# Patient Record
Sex: MALE | Race: WHITE | NOT HISPANIC OR LATINO | Employment: OTHER | URBAN - NONMETROPOLITAN AREA
[De-identification: names, ages, dates, MRNs, and addresses within clinical notes are randomized per-mention and may not be internally consistent; named-entity substitution may affect disease eponyms.]

---

## 2023-03-28 DIAGNOSIS — I10 ESSENTIAL (PRIMARY) HYPERTENSION: ICD-10-CM

## 2023-03-28 RX ORDER — GABAPENTIN 600 MG/1
TABLET ORAL
COMMUNITY
End: 2024-01-17 | Stop reason: SDUPTHER

## 2023-03-28 RX ORDER — DICLOFENAC SODIUM 30 MG/G
GEL TOPICAL
COMMUNITY
Start: 2020-12-08

## 2023-03-28 RX ORDER — FINASTERIDE 5 MG/1
5 TABLET, FILM COATED ORAL DAILY
COMMUNITY
End: 2024-01-18

## 2023-03-28 RX ORDER — NAPROXEN 500 MG/1
500 TABLET ORAL 2 TIMES DAILY
COMMUNITY
End: 2023-07-06

## 2023-03-28 RX ORDER — PRAVASTATIN SODIUM 40 MG/1
40 TABLET ORAL DAILY
COMMUNITY
End: 2023-08-25

## 2023-03-28 RX ORDER — TAMSULOSIN HYDROCHLORIDE 0.4 MG/1
CAPSULE ORAL
COMMUNITY
Start: 2019-04-18 | End: 2023-09-18

## 2023-03-28 RX ORDER — LISINOPRIL 5 MG/1
TABLET ORAL
Qty: 90 TABLET | Refills: 3 | Status: SHIPPED | OUTPATIENT
Start: 2023-03-28 | End: 2023-08-01 | Stop reason: DRUGHIGH

## 2023-03-28 RX ORDER — CYCLOSPORINE 0.5 MG/ML
EMULSION OPHTHALMIC EVERY 12 HOURS
COMMUNITY
Start: 2014-08-15

## 2023-03-28 RX ORDER — LISINOPRIL 10 MG/1
1 TABLET ORAL DAILY
COMMUNITY
Start: 2023-02-08 | End: 2023-07-24

## 2023-05-31 LAB
AMPHETAMINE (PRESENCE) IN URINE BY SCREEN METHOD: NORMAL
AMPHETAMINE (PRESENCE) IN URINE BY SCREEN METHOD: NORMAL
BARBITURATES PRESENCE IN URINE BY SCREEN METHOD: NORMAL
BARBITURATES PRESENCE IN URINE BY SCREEN METHOD: NORMAL
BENZODIAZEPINE (PRESENCE) IN URINE BY SCREEN METHOD: NORMAL
BENZODIAZEPINE (PRESENCE) IN URINE BY SCREEN METHOD: NORMAL
CANNABINOIDS IN URINE BY SCREEN METHOD: NORMAL
CANNABINOIDS IN URINE BY SCREEN METHOD: NORMAL
COCAINE (PRESENCE) IN URINE BY SCREEN METHOD: NORMAL
COCAINE (PRESENCE) IN URINE BY SCREEN METHOD: NORMAL
DRUG SCREEN COMMENT URINE: NORMAL
DRUG SCREEN COMMENT URINE: NORMAL
FENTANYL URINE: NORMAL
FENTANYL URINE: NORMAL
METHADONE (PRESENCE) IN URINE BY SCREEN METHOD: NORMAL
METHADONE (PRESENCE) IN URINE BY SCREEN METHOD: NORMAL
OPIATES (PRESENCE) IN URINE BY SCREEN METHOD: NORMAL
OPIATES (PRESENCE) IN URINE BY SCREEN METHOD: NORMAL
OXYCODONE (PRESENCE) IN URINE BY SCREEN METHOD: NORMAL
OXYCODONE (PRESENCE) IN URINE BY SCREEN METHOD: NORMAL
PHENCYCLIDINE (PRESENCE) IN URINE BY SCREEN METHOD: NORMAL
PHENCYCLIDINE (PRESENCE) IN URINE BY SCREEN METHOD: NORMAL

## 2023-06-12 ENCOUNTER — HOSPITAL ENCOUNTER (OUTPATIENT)
Dept: DATA CONVERSION | Facility: HOSPITAL | Age: 78
End: 2023-06-12
Attending: ANESTHESIOLOGY | Admitting: ANESTHESIOLOGY
Payer: MEDICARE

## 2023-06-12 DIAGNOSIS — M54.17 RADICULOPATHY, LUMBOSACRAL REGION: ICD-10-CM

## 2023-06-12 DIAGNOSIS — M96.1 POSTLAMINECTOMY SYNDROME, NOT ELSEWHERE CLASSIFIED: ICD-10-CM

## 2023-06-12 DIAGNOSIS — Z96.641 PRESENCE OF RIGHT ARTIFICIAL HIP JOINT: ICD-10-CM

## 2023-06-12 DIAGNOSIS — M19.90 UNSPECIFIED OSTEOARTHRITIS, UNSPECIFIED SITE: ICD-10-CM

## 2023-06-12 DIAGNOSIS — E78.00 PURE HYPERCHOLESTEROLEMIA, UNSPECIFIED: ICD-10-CM

## 2023-06-12 DIAGNOSIS — I10 ESSENTIAL (PRIMARY) HYPERTENSION: ICD-10-CM

## 2023-06-26 ENCOUNTER — APPOINTMENT (OUTPATIENT)
Dept: PRIMARY CARE | Facility: CLINIC | Age: 78
End: 2023-06-26
Payer: MEDICARE

## 2023-07-06 DIAGNOSIS — Z00.00 ENCOUNTER FOR GENERAL ADULT MEDICAL EXAMINATION WITHOUT ABNORMAL FINDINGS: ICD-10-CM

## 2023-07-06 PROBLEM — G60.3 IDIOPATHIC PROGRESSIVE NEUROPATHY: Status: ACTIVE | Noted: 2023-07-06

## 2023-07-06 PROBLEM — C44.91 BASAL CELL CARCINOMA: Status: ACTIVE | Noted: 2023-07-06

## 2023-07-06 PROBLEM — M96.1 POSTLAMINECTOMY SYNDROME, LUMBAR: Status: ACTIVE | Noted: 2023-07-06

## 2023-07-06 PROBLEM — R07.89 CHEST PRESSURE: Status: ACTIVE | Noted: 2023-07-06

## 2023-07-06 PROBLEM — I25.10 ARTERIOSCLEROSIS OF CORONARY ARTERY: Status: ACTIVE | Noted: 2023-07-06

## 2023-07-06 PROBLEM — M48.061 LUMBAR CANAL STENOSIS: Status: ACTIVE | Noted: 2023-07-06

## 2023-07-06 PROBLEM — D22.9 SUSPICIOUS NEVUS: Status: ACTIVE | Noted: 2023-07-06

## 2023-07-06 PROBLEM — M51.26 LUMBAR DISC HERNIATION: Status: ACTIVE | Noted: 2023-07-06

## 2023-07-06 PROBLEM — U07.1 COVID-19: Status: ACTIVE | Noted: 2023-07-06

## 2023-07-06 PROBLEM — M47.816 FACET DEGENERATION OF LUMBAR REGION: Status: ACTIVE | Noted: 2023-07-06

## 2023-07-06 PROBLEM — M54.16 LUMBAR RADICULOPATHY, CHRONIC: Status: ACTIVE | Noted: 2023-07-06

## 2023-07-06 PROBLEM — E78.5 HLD (HYPERLIPIDEMIA): Status: ACTIVE | Noted: 2023-07-06

## 2023-07-06 PROBLEM — R31.9 HEMATURIA: Status: ACTIVE | Noted: 2023-07-06

## 2023-07-06 PROBLEM — I10 BENIGN ESSENTIAL HTN: Status: ACTIVE | Noted: 2023-07-06

## 2023-07-06 PROBLEM — N39.0 URINARY TRACT INFECTION: Status: ACTIVE | Noted: 2023-07-06

## 2023-07-06 PROBLEM — R05.9 COUGH: Status: ACTIVE | Noted: 2023-07-06

## 2023-07-06 PROBLEM — N40.0 BENIGN ENLARGEMENT OF PROSTATE: Status: ACTIVE | Noted: 2023-07-06

## 2023-07-06 PROBLEM — K02.9 DENTAL CARIES: Status: ACTIVE | Noted: 2023-07-06

## 2023-07-06 PROBLEM — M53.3 SACROILIAC JOINT DYSFUNCTION: Status: ACTIVE | Noted: 2023-07-06

## 2023-07-06 PROBLEM — S16.1XXA CERVICAL STRAIN: Status: ACTIVE | Noted: 2023-07-06

## 2023-07-06 PROBLEM — J30.9 ALLERGIC RHINITIS: Status: ACTIVE | Noted: 2023-07-06

## 2023-07-06 PROBLEM — M16.11 PRIMARY OSTEOARTHRITIS OF RIGHT HIP: Status: ACTIVE | Noted: 2023-07-06

## 2023-07-06 PROBLEM — N52.9 MALE ERECTILE DISORDER OF ORGANIC ORIGIN: Status: ACTIVE | Noted: 2023-07-06

## 2023-07-06 PROBLEM — B35.1 ONYCHOMYCOSIS: Status: ACTIVE | Noted: 2023-07-06

## 2023-07-06 PROBLEM — M25.551 RIGHT HIP PAIN: Status: ACTIVE | Noted: 2023-07-06

## 2023-07-06 RX ORDER — NAPROXEN 500 MG/1
TABLET ORAL
Qty: 180 TABLET | Refills: 1 | Status: SHIPPED | OUTPATIENT
Start: 2023-07-06 | End: 2023-10-30 | Stop reason: SDUPTHER

## 2023-07-06 RX ORDER — LIDOCAINE 40 MG/G
CREAM TOPICAL
COMMUNITY
Start: 2023-02-22

## 2023-07-23 DIAGNOSIS — I10 ESSENTIAL (PRIMARY) HYPERTENSION: ICD-10-CM

## 2023-07-24 RX ORDER — LISINOPRIL 10 MG/1
10 TABLET ORAL DAILY
Qty: 90 TABLET | Refills: 1 | Status: SHIPPED | OUTPATIENT
Start: 2023-07-24 | End: 2023-08-01 | Stop reason: DRUGHIGH

## 2023-08-01 DIAGNOSIS — I10 ESSENTIAL (PRIMARY) HYPERTENSION: ICD-10-CM

## 2023-08-01 RX ORDER — LISINOPRIL 10 MG/1
10 TABLET ORAL DAILY
Qty: 90 TABLET | Refills: 1 | Status: SHIPPED | OUTPATIENT
Start: 2023-08-01 | End: 2024-01-18

## 2023-08-17 RX ORDER — DEXTROMETHORPHAN HYDROBROMIDE, GUAIFENESIN 5; 100 MG/5ML; MG/5ML
1 LIQUID ORAL 3 TIMES DAILY PRN
COMMUNITY
Start: 2023-07-18

## 2023-08-17 NOTE — PROGRESS NOTES
Diego Dumont is a 78 y.o. male who presents for Medicare Annual Wellness Visit Subsequent (Spot behind right ear, painful to the touch, been there for quite a while; ).     h/o BCC: Here for skin check today.      Back pain, hip pain: seeing Kirby. Getting injections. Doing well with hip pain after hip replacement surgery.      BPH: Doing reasonably well on flomax and finasteride together.     CAD: on aspirin. No longer needing to see cardiology.     HTN: on lisinopril, no SE's.     HLD: On pravastatin, no SE's.     All other systems have been reviewed and are negative for complaint     Objective   /79 (BP Location: Right arm, Patient Position: Sitting, BP Cuff Size: Large adult)   Pulse 76   Wt 75 kg (165 lb 6.4 oz)   BMI 25.15 kg/m²     Gen: No acute distress. Alert and oriented x3.   HEENT: Normocephalic, atraumatic. PERRLA and EOMI, no conjunctival injection. B/L EAC are clear, TM's viewed are WNL. No rhinorrhea, no oropharyngeal lesions.  Neck: No lymphadenopathy, thyroid WNL.  CV: Regular rate and rhythm. Normal S1/S2.  Resp: CTAB/L. No wheezes or rhonchi appreciated.  Abdomen: Soft. Nontender. Nondistended. Bowel sounds normoactive. No guarding or rigidity.  Derm: Skin is warm and dry. No rashes appreciated, Right ear with likely basal cell lesion noted  Neuro: Cranial nerves intact. Normal gait.  Psych: Appropriate mood and affect. Normal speech and eye contact.   Extremities: No deformities appreciated. No severe edema.    Assessment/Plan     79yo male here for a followup visit:    #Suspicious nevus  Referring to derm today     #BCC:  skin check today, no concerning lesions today     #Hip pain  doing well since hip replacement surgery     #Onychomycosis  rx sent terbinafine     #BPH  Doing well on flomax and finasteride     #CAD  asymptomatic, seen and cleared without needing followup by Dr. Cardona (cardiology)  no h/o stent     #HTN  Controlled on lisinopril     #HLD  Controlled on  pravastatin     #OA  low back, shoulder, hands, on naproxen and gabapentin  Seeing pain management (Kirby), receiving injections     Health Maintenance  Colonoscopy 2019, will go based on symptoms at this point  UTD for PNA and COVID vaccine   flu shot today

## 2023-08-23 ENCOUNTER — OFFICE VISIT (OUTPATIENT)
Dept: PRIMARY CARE | Facility: CLINIC | Age: 78
End: 2023-08-23
Payer: MEDICARE

## 2023-08-23 VITALS
DIASTOLIC BLOOD PRESSURE: 79 MMHG | WEIGHT: 165.4 LBS | BODY MASS INDEX: 25.15 KG/M2 | HEART RATE: 76 BPM | SYSTOLIC BLOOD PRESSURE: 138 MMHG

## 2023-08-23 DIAGNOSIS — Z23 NEED FOR INFLUENZA VACCINATION: ICD-10-CM

## 2023-08-23 DIAGNOSIS — Z12.5 SCREENING FOR PROSTATE CANCER: ICD-10-CM

## 2023-08-23 DIAGNOSIS — E78.5 HYPERLIPIDEMIA, UNSPECIFIED HYPERLIPIDEMIA TYPE: Primary | ICD-10-CM

## 2023-08-23 PROCEDURE — 1159F MED LIST DOCD IN RCRD: CPT | Performed by: FAMILY MEDICINE

## 2023-08-23 PROCEDURE — 1036F TOBACCO NON-USER: CPT | Performed by: FAMILY MEDICINE

## 2023-08-23 PROCEDURE — 3078F DIAST BP <80 MM HG: CPT | Performed by: FAMILY MEDICINE

## 2023-08-23 PROCEDURE — G0008 ADMIN INFLUENZA VIRUS VAC: HCPCS | Performed by: FAMILY MEDICINE

## 2023-08-23 PROCEDURE — G0439 PPPS, SUBSEQ VISIT: HCPCS | Performed by: FAMILY MEDICINE

## 2023-08-23 PROCEDURE — 90662 IIV NO PRSV INCREASED AG IM: CPT | Performed by: FAMILY MEDICINE

## 2023-08-23 PROCEDURE — 1125F AMNT PAIN NOTED PAIN PRSNT: CPT | Performed by: FAMILY MEDICINE

## 2023-08-23 PROCEDURE — 1170F FXNL STATUS ASSESSED: CPT | Performed by: FAMILY MEDICINE

## 2023-08-23 PROCEDURE — 3075F SYST BP GE 130 - 139MM HG: CPT | Performed by: FAMILY MEDICINE

## 2023-08-23 ASSESSMENT — ACTIVITIES OF DAILY LIVING (ADL)
TAKING_MEDICATION: INDEPENDENT
MANAGING_FINANCES: INDEPENDENT
DOING_HOUSEWORK: INDEPENDENT
GROCERY_SHOPPING: INDEPENDENT
BATHING: INDEPENDENT
DRESSING: INDEPENDENT

## 2023-08-23 ASSESSMENT — ENCOUNTER SYMPTOMS
LOSS OF SENSATION IN FEET: 1
OCCASIONAL FEELINGS OF UNSTEADINESS: 1
DEPRESSION: 0

## 2023-08-23 ASSESSMENT — PATIENT HEALTH QUESTIONNAIRE - PHQ9
SUM OF ALL RESPONSES TO PHQ9 QUESTIONS 1 AND 2: 0
2. FEELING DOWN, DEPRESSED OR HOPELESS: NOT AT ALL
1. LITTLE INTEREST OR PLEASURE IN DOING THINGS: NOT AT ALL

## 2023-08-25 DIAGNOSIS — I25.10 ATHEROSCLEROTIC HEART DISEASE OF NATIVE CORONARY ARTERY WITHOUT ANGINA PECTORIS: ICD-10-CM

## 2023-08-25 RX ORDER — PRAVASTATIN SODIUM 40 MG/1
40 TABLET ORAL DAILY
Qty: 90 TABLET | Refills: 3 | Status: SHIPPED | OUTPATIENT
Start: 2023-08-25 | End: 2023-09-06 | Stop reason: SDUPTHER

## 2023-09-01 ENCOUNTER — TELEPHONE (OUTPATIENT)
Dept: PRIMARY CARE | Facility: CLINIC | Age: 78
End: 2023-09-01
Payer: MEDICARE

## 2023-09-01 DIAGNOSIS — J20.9 ACUTE BRONCHITIS, UNSPECIFIED ORGANISM: Primary | ICD-10-CM

## 2023-09-01 RX ORDER — CEPHALEXIN 500 MG/1
500 CAPSULE ORAL 2 TIMES DAILY
Qty: 20 CAPSULE | Refills: 0 | Status: SHIPPED | OUTPATIENT
Start: 2023-09-01 | End: 2023-09-11

## 2023-09-01 NOTE — TELEPHONE ENCOUNTER
Pt called in stating he now has a sore throat, coughing up green phlegm, runny nose. Was just here last week. Can you send something or does he need to be seen?

## 2023-09-06 DIAGNOSIS — I25.10 ATHEROSCLEROTIC HEART DISEASE OF NATIVE CORONARY ARTERY WITHOUT ANGINA PECTORIS: ICD-10-CM

## 2023-09-06 RX ORDER — PRAVASTATIN SODIUM 40 MG/1
40 TABLET ORAL DAILY
Qty: 90 TABLET | Refills: 3 | Status: SHIPPED | OUTPATIENT
Start: 2023-09-06

## 2023-09-07 VITALS — HEIGHT: 68 IN | BODY MASS INDEX: 24.32 KG/M2 | WEIGHT: 160.5 LBS

## 2023-09-18 DIAGNOSIS — N40.0 BENIGN PROSTATIC HYPERPLASIA WITHOUT LOWER URINARY TRACT SYMPTOMS: ICD-10-CM

## 2023-09-18 RX ORDER — TAMSULOSIN HYDROCHLORIDE 0.4 MG/1
0.4 CAPSULE ORAL NIGHTLY
Qty: 90 CAPSULE | Refills: 3 | Status: SHIPPED | OUTPATIENT
Start: 2023-09-18 | End: 2023-10-30 | Stop reason: SDUPTHER

## 2023-10-30 DIAGNOSIS — N40.0 BENIGN PROSTATIC HYPERPLASIA WITHOUT LOWER URINARY TRACT SYMPTOMS: ICD-10-CM

## 2023-10-30 DIAGNOSIS — Z00.00 ENCOUNTER FOR GENERAL ADULT MEDICAL EXAMINATION WITHOUT ABNORMAL FINDINGS: ICD-10-CM

## 2023-10-30 RX ORDER — NAPROXEN 500 MG/1
500 TABLET ORAL 2 TIMES DAILY
Qty: 180 TABLET | Refills: 1 | Status: SHIPPED | OUTPATIENT
Start: 2023-10-30

## 2023-10-30 RX ORDER — TAMSULOSIN HYDROCHLORIDE 0.4 MG/1
0.4 CAPSULE ORAL NIGHTLY
Qty: 90 CAPSULE | Refills: 3 | Status: SHIPPED | OUTPATIENT
Start: 2023-10-30

## 2023-12-20 ENCOUNTER — LAB (OUTPATIENT)
Dept: LAB | Facility: LAB | Age: 78
End: 2023-12-20
Payer: MEDICARE

## 2023-12-20 DIAGNOSIS — E78.5 HYPERLIPIDEMIA, UNSPECIFIED HYPERLIPIDEMIA TYPE: ICD-10-CM

## 2023-12-20 DIAGNOSIS — Z12.5 SCREENING FOR PROSTATE CANCER: ICD-10-CM

## 2023-12-20 LAB
ALBUMIN SERPL BCP-MCNC: 4.4 G/DL (ref 3.4–5)
ALP SERPL-CCNC: 71 U/L (ref 33–136)
ALT SERPL W P-5'-P-CCNC: 30 U/L (ref 10–52)
ANION GAP SERPL CALC-SCNC: 10 MMOL/L (ref 10–20)
AST SERPL W P-5'-P-CCNC: 33 U/L (ref 9–39)
BASOPHILS # BLD AUTO: 0.11 X10*3/UL (ref 0–0.1)
BASOPHILS NFR BLD AUTO: 1 %
BILIRUB SERPL-MCNC: 0.6 MG/DL (ref 0–1.2)
BUN SERPL-MCNC: 17 MG/DL (ref 6–23)
CALCIUM SERPL-MCNC: 9.3 MG/DL (ref 8.6–10.3)
CHLORIDE SERPL-SCNC: 107 MMOL/L (ref 98–107)
CHOLEST SERPL-MCNC: 145 MG/DL (ref 0–199)
CHOLESTEROL/HDL RATIO: 5.2
CO2 SERPL-SCNC: 29 MMOL/L (ref 21–32)
CREAT SERPL-MCNC: 1.06 MG/DL (ref 0.5–1.3)
EOSINOPHIL # BLD AUTO: 0.73 X10*3/UL (ref 0–0.4)
EOSINOPHIL NFR BLD AUTO: 6.4 %
ERYTHROCYTE [DISTWIDTH] IN BLOOD BY AUTOMATED COUNT: 14.2 % (ref 11.5–14.5)
GFR SERPL CREATININE-BSD FRML MDRD: 72 ML/MIN/1.73M*2
GLUCOSE SERPL-MCNC: 80 MG/DL (ref 74–99)
HCT VFR BLD AUTO: 48.3 % (ref 41–52)
HDLC SERPL-MCNC: 27.9 MG/DL
HGB BLD-MCNC: 15.7 G/DL (ref 13.5–17.5)
IMM GRANULOCYTES # BLD AUTO: 0.03 X10*3/UL (ref 0–0.5)
IMM GRANULOCYTES NFR BLD AUTO: 0.3 % (ref 0–0.9)
LDLC SERPL CALC-MCNC: 91 MG/DL
LYMPHOCYTES # BLD AUTO: 3.91 X10*3/UL (ref 0.8–3)
LYMPHOCYTES NFR BLD AUTO: 34.4 %
MCH RBC QN AUTO: 29 PG (ref 26–34)
MCHC RBC AUTO-ENTMCNC: 32.5 G/DL (ref 32–36)
MCV RBC AUTO: 89 FL (ref 80–100)
MONOCYTES # BLD AUTO: 0.76 X10*3/UL (ref 0.05–0.8)
MONOCYTES NFR BLD AUTO: 6.7 %
NEUTROPHILS # BLD AUTO: 5.81 X10*3/UL (ref 1.6–5.5)
NEUTROPHILS NFR BLD AUTO: 51.2 %
NON HDL CHOLESTEROL: 117 MG/DL (ref 0–149)
NRBC BLD-RTO: 0 /100 WBCS (ref 0–0)
PLATELET # BLD AUTO: 201 X10*3/UL (ref 150–450)
POTASSIUM SERPL-SCNC: 4.3 MMOL/L (ref 3.5–5.3)
PROT SERPL-MCNC: 7.1 G/DL (ref 6.4–8.2)
RBC # BLD AUTO: 5.41 X10*6/UL (ref 4.5–5.9)
SODIUM SERPL-SCNC: 142 MMOL/L (ref 136–145)
TRIGL SERPL-MCNC: 132 MG/DL (ref 0–149)
VLDL: 26 MG/DL (ref 0–40)
WBC # BLD AUTO: 11.4 X10*3/UL (ref 4.4–11.3)

## 2023-12-20 PROCEDURE — 36415 COLL VENOUS BLD VENIPUNCTURE: CPT

## 2023-12-20 PROCEDURE — 80061 LIPID PANEL: CPT

## 2023-12-20 PROCEDURE — 85025 COMPLETE CBC W/AUTO DIFF WBC: CPT

## 2023-12-20 PROCEDURE — 80053 COMPREHEN METABOLIC PANEL: CPT

## 2023-12-20 PROCEDURE — G0103 PSA SCREENING: HCPCS

## 2023-12-21 LAB — PSA SERPL-MCNC: 1.79 NG/ML

## 2024-01-01 ENCOUNTER — HOSPITAL ENCOUNTER (INPATIENT)
Age: 79
End: 2024-01-01
Attending: STUDENT IN AN ORGANIZED HEALTH CARE EDUCATION/TRAINING PROGRAM | Admitting: STUDENT IN AN ORGANIZED HEALTH CARE EDUCATION/TRAINING PROGRAM
Payer: MEDICARE

## 2024-01-01 ENCOUNTER — TELEPHONE (OUTPATIENT)
Dept: CRITICAL CARE MEDICINE | Facility: HOSPITAL | Age: 79
End: 2024-01-01

## 2024-01-01 DIAGNOSIS — M25.511 ACUTE PAIN OF RIGHT SHOULDER: Primary | ICD-10-CM

## 2024-01-17 ENCOUNTER — OFFICE VISIT (OUTPATIENT)
Dept: PAIN MEDICINE | Facility: HOSPITAL | Age: 79
End: 2024-01-17
Payer: MEDICARE

## 2024-01-17 VITALS
TEMPERATURE: 97.8 F | SYSTOLIC BLOOD PRESSURE: 149 MMHG | HEART RATE: 86 BPM | HEIGHT: 68 IN | DIASTOLIC BLOOD PRESSURE: 86 MMHG | WEIGHT: 166 LBS | BODY MASS INDEX: 25.16 KG/M2

## 2024-01-17 DIAGNOSIS — M47.817 FACET ARTHROPATHY, LUMBOSACRAL: ICD-10-CM

## 2024-01-17 DIAGNOSIS — M48.062 LUMBAR STENOSIS WITH NEUROGENIC CLAUDICATION: ICD-10-CM

## 2024-01-17 DIAGNOSIS — M54.16 CHRONIC LUMBAR RADICULOPATHY: Primary | ICD-10-CM

## 2024-01-17 DIAGNOSIS — Z79.899 MEDICATION MANAGEMENT: ICD-10-CM

## 2024-01-17 LAB
AMPHETAMINES UR QL SCN: NORMAL
BARBITURATES UR QL SCN: NORMAL
BENZODIAZ UR QL SCN: NORMAL
BZE UR QL SCN: NORMAL
CANNABINOIDS UR QL SCN: NORMAL
FENTANYL+NORFENTANYL UR QL SCN: NORMAL
OPIATES UR QL SCN: NORMAL
OXYCODONE+OXYMORPHONE UR QL SCN: NORMAL
PCP UR QL SCN: NORMAL

## 2024-01-17 PROCEDURE — 3077F SYST BP >= 140 MM HG: CPT | Performed by: NURSE PRACTITIONER

## 2024-01-17 PROCEDURE — 80307 DRUG TEST PRSMV CHEM ANLYZR: CPT | Performed by: NURSE PRACTITIONER

## 2024-01-17 PROCEDURE — 1159F MED LIST DOCD IN RCRD: CPT | Performed by: NURSE PRACTITIONER

## 2024-01-17 PROCEDURE — 1125F AMNT PAIN NOTED PAIN PRSNT: CPT | Performed by: NURSE PRACTITIONER

## 2024-01-17 PROCEDURE — 99213 OFFICE O/P EST LOW 20 MIN: CPT | Mod: ZK | Performed by: NURSE PRACTITIONER

## 2024-01-17 PROCEDURE — 1036F TOBACCO NON-USER: CPT | Performed by: NURSE PRACTITIONER

## 2024-01-17 PROCEDURE — 99213 OFFICE O/P EST LOW 20 MIN: CPT | Performed by: NURSE PRACTITIONER

## 2024-01-17 PROCEDURE — 3079F DIAST BP 80-89 MM HG: CPT | Performed by: NURSE PRACTITIONER

## 2024-01-17 RX ORDER — GABAPENTIN 600 MG/1
TABLET ORAL
Qty: 270 TABLET | Refills: 3 | Status: SHIPPED | OUTPATIENT
Start: 2024-01-17

## 2024-01-17 ASSESSMENT — PAIN SCALES - GENERAL: PAINLEVEL: 1

## 2024-01-17 NOTE — PROGRESS NOTES
I have personally reviewed the OARRS report for Diego Dumont   . I have considered the risks of abuse, dependence, addiction and diversion.   Is the patient prescribed a combination of a benzodiazepine and opioid? No  Patient tolerating without AE. Benefit outweighs the risk. Discussed risks/benefits with patient. All questions answered. States understanding.     Date of the last Controlled Substance Agreement: 1/17/224    Last urine drug screening date/ordered today: 01/17/24  Results of last screen: Results as expected.     OPIOID   What is the patient’s goal of therapy? PAIN CONTROL.  Is this being achieved with current treatment? Yes  Attestation statement: I feel that it is clinically indicated to continue this current medication regimen after consideration of alternative therapies, and other non-opioid treatments.     Opioid Risk Screening:   THE OPIOID RISK TOOL (ORT)                               Female                     Male    Alcohol                            [1] =                          [3] = 0  Illegal Drugs                           [2] =                           [3]  = 0    1. Family History of Substance Abuse Prescription Drugs                           [4]=                           [4]  = 0  Alcohol                           [3] =                          [3]   =0  Illicit Drugs                           [4]=                           [4]   =0    2. Personal History of Substance Abuse Prescription Drugs                          [5]=                            [5]   =0    3. Age (If between 16 to 45)                          [1]=                           [1]   =0    4. History of Preadolescent Sexual Abuse                         [3]=                            [0]   =0    ADD, OCD, Bipolar, Schizophrenia                         [2]=                            [2]   =0    5. Psychological Disease Depression                        [1]=                             [1]   =0    TOTAL Score =   0     Last opioid risk screening date/ordered today: 1/17/2024  Patient's total score is 0    Reference :  Low Score = 0 to 3  Moderate Score = 4 to 7  High Score = =8       Pain Scale Screening:   Pain Assessment and Documentation Tool (PADT)   Date of Assessment: 1/17/2024  Analgesia:   Patient reports her pain level on average during the past week is 1on a 0 - 10 scale.   Patient reports that her pain level at its worst during the past week was 7 on a 0 -10 scale.   60% of pain has been relieved during the past week per patient   Patient states that the amount of pain relief she is now obtaining from her current pain reliever(s) is enough to make a real difference in her life.     Activities of Daily Living:   Physical functioning: unchanged  Family relationships: unchanged  Social relationships: unchanged  Mood: unchanged  Sleep patterns: unchanged  Overall functioning: unchanged  Adverse Events: No, Diego Dumont is not experiencing side effects from current pain reliever.  Patients overall severity of side effect:none  Specific Analgesic Plan: Continue present regimen.

## 2024-01-17 NOTE — PROGRESS NOTES
Subjective   Diego Dumont is a pleasant 78 y.o. male who is here for a follow-up visit.  Patient is ambulatory.  Gait is steady.  Patient arrives by himself.    Patient continues to have chronic lower back pain.  He rates his pain as 1 out of 10.  Pain increases with activities.  He describes it as burning.  He continues to have numbness, pins-and-needles sensation down to his legs all the way to his feet.  He denies leg weakness or change in balance.  He denies bowel or bladder incontinence.  He denies recent falls, injuries, or ER visits.  There has been no change since he was last seen.    Patient continues to take gabapentin 600 mg in the morning and 1200 mg at night.  He takes naproxen as needed.  He denies side effects from his medications.  I discussed the plan of care.  I will see him for his follow-up visit.  Patient would like to have an injection done sometime in June.  Questions were answered during this encounter.      Objective   Physical Exam  Vitals and nursing note reviewed.   HENT:      Head: Normocephalic.      Nose: Nose normal.   Eyes:      Extraocular Movements: Extraocular movements intact.      Conjunctiva/sclera: Conjunctivae normal.      Pupils: Pupils are equal, round, and reactive to light.   Cardiovascular:      Rate and Rhythm: Normal rate and regular rhythm.   Pulmonary:      Effort: Pulmonary effort is normal.      Breath sounds: Normal breath sounds.   Musculoskeletal:         General: Tenderness present. No swelling, deformity or signs of injury.      Cervical back: No rigidity or tenderness.      Lumbar back: Tenderness present.      Right lower leg: No edema.      Left lower leg: No edema.      Comments: Negative leg raise.  Positive for paraspinal tenderness at the lumbar region bilaterally at L4-L5, L5-S1 with rotation.  With nonspecific radicular symptoms.  BUE 4/5, BLE 4/5.   Skin:     General: Skin is warm and dry.   Neurological:      General: No focal deficit present.       Mental Status: He is alert and oriented to person, place, and time.   Psychiatric:         Mood and Affect: Mood normal.         Behavior: Behavior normal.            Pain Management Panel  More data exists         Latest Ref Rng & Units 5/31/2023 2/22/2023   Pain Management Panel   Amphetamine Screen, Urine NEGATIVE PRESUMPTIVE NEGATIVE  CANCELED  PRESUMPTIVE NEGATIVE    Barbiturate Screen, Urine NEGATIVE PRESUMPTIVE NEGATIVE  CANCELED  PRESUMPTIVE NEGATIVE    Fentanyl Screen, Urine NEGATIVE PRESUMPTIVE NEGATIVE  CANCELED  PRESUMPTIVE NEGATIVE    Methadone Screen, Urine NEGATIVE PRESUMPTIVE NEGATIVE  CANCELED  PRESUMPTIVE NEGATIVE           Assessment/Plan   Problem List Items Addressed This Visit             ICD-10-CM    Lumbar stenosis with neurogenic claudication M48.062    Relevant Medications    gabapentin (Neurontin) 600 mg tablet    Chronic lumbar radiculopathy - Primary M54.16    Relevant Medications    gabapentin (Neurontin) 600 mg tablet    Facet arthropathy, lumbosacral M47.817    Relevant Medications    gabapentin (Neurontin) 600 mg tablet     Other Visit Diagnoses         Codes    Medication management     Z79.899    Relevant Orders    Drug Screen, Urine With Reflex to Confirmation                Plan/Follow-up Instructions:      -  Continue taking gabapentin 600 mg in the morning and 900 mg at night.  -  Continue taking naproxen as needed.  -  I will see you for your follow-up visit in June.      Disclaimer: This note was created using voice recognition software. It was not corrected for typographical or grammatical errors, inadvertent word insertion, or any unintended errors. Please feel free to contact me for clarification.      Kristie Ho, SINDY, APRN, FNP-C   [facility]  Office #: [phone]  Fax # [phone]

## 2024-01-18 DIAGNOSIS — N40.0 BENIGN PROSTATIC HYPERPLASIA WITHOUT LOWER URINARY TRACT SYMPTOMS: ICD-10-CM

## 2024-01-18 DIAGNOSIS — I10 ESSENTIAL (PRIMARY) HYPERTENSION: ICD-10-CM

## 2024-01-18 RX ORDER — FINASTERIDE 5 MG/1
5 TABLET, FILM COATED ORAL DAILY
Qty: 90 TABLET | Refills: 3 | Status: SHIPPED | OUTPATIENT
Start: 2024-01-18 | End: 2024-01-29 | Stop reason: SDUPTHER

## 2024-01-18 RX ORDER — LISINOPRIL 10 MG/1
10 TABLET ORAL DAILY
Qty: 90 TABLET | Refills: 1 | Status: SHIPPED | OUTPATIENT
Start: 2024-01-18 | End: 2024-01-29 | Stop reason: SDUPTHER

## 2024-01-29 DIAGNOSIS — N40.0 BENIGN PROSTATIC HYPERPLASIA WITHOUT LOWER URINARY TRACT SYMPTOMS: ICD-10-CM

## 2024-01-29 DIAGNOSIS — I10 ESSENTIAL (PRIMARY) HYPERTENSION: ICD-10-CM

## 2024-01-29 RX ORDER — FINASTERIDE 5 MG/1
5 TABLET, FILM COATED ORAL DAILY
Qty: 90 TABLET | Refills: 1 | Status: SHIPPED | OUTPATIENT
Start: 2024-01-29

## 2024-01-29 RX ORDER — LISINOPRIL 10 MG/1
10 TABLET ORAL DAILY
Qty: 90 TABLET | Refills: 1 | Status: SHIPPED | OUTPATIENT
Start: 2024-01-29

## 2024-02-19 NOTE — PROGRESS NOTES
"Diego Dumont is a 79 y.o. male who presents for Follow-up (6 months, would like to have skin check today)    h/o BCC: Doing skin checks yearly in the summer.     Back pain, hip pain: seeing Kirby. Getting injections. Doing well with hip pain after hip replacement surgery.      BPH: Doing reasonably well on flomax and finasteride together.     CAD: on aspirin. No longer needing to see cardiology.     HTN: on lisinopril, no SE's.     HLD: On pravastatin, no SE's.     All other systems have been reviewed and are negative for complaint     Objective   /86 (BP Location: Left arm, Patient Position: Sitting, BP Cuff Size: Adult)   Pulse 70   Ht 1.727 m (5' 8\")   Wt 75.3 kg (166 lb)   BMI 25.24 kg/m²     Gen: No acute distress, alert and oriented x3, pleasant   HEENT: moist mucous membranes, b/l external auditory canals are clear of debris, TMs within normal limits, no oropharyngeal lesions, eomi, perrla   Neck: thyroid within normal limits, no lymphadenopathy   CV: RRR, normal S1/S2, no murmur   Resp: Clear to auscultation bilaterally, no wheezes or rhonchi appreciated  Abd: soft, nontender, non-distended, no guarding/rigidity, bowel sounds present  Extr: no edema, no calf tenderness  Derm: Skin is warm and dry, no rashes appreciated  Psych: mood is good, affect is congruent, good hygiene, normal speech and eye contact  Neuro: cranial nerves grossly intact, normal gait    Assessment/Plan     #Suspicious nevus  Had cryo through derm     #BCC:  skin check today, no concerning lesions today     #Hip pain  doing well since hip replacement surgery     #Onychomycosis  rx sent terbinafine     #BPH  Doing well on flomax and finasteride     #CAD  asymptomatic, seen and cleared without needing followup by Dr. Cardona (cardiology)  no h/o stent     #HTN  Controlled on lisinopril     #HLD  Controlled on pravastatin     #OA  low back, shoulder, hands, on naproxen and gabapentin  Seeing pain management (Kirby), " receiving injections     Health Maintenance  Colonoscopy 2019, will go based on symptoms at this point  UTD for flu, PNA, and COVID vaccine

## 2024-02-26 ENCOUNTER — OFFICE VISIT (OUTPATIENT)
Dept: PRIMARY CARE | Facility: CLINIC | Age: 79
End: 2024-02-26
Payer: MEDICARE

## 2024-02-26 VITALS
BODY MASS INDEX: 25.16 KG/M2 | WEIGHT: 166 LBS | HEIGHT: 68 IN | SYSTOLIC BLOOD PRESSURE: 138 MMHG | HEART RATE: 70 BPM | DIASTOLIC BLOOD PRESSURE: 78 MMHG

## 2024-02-26 DIAGNOSIS — I10 BENIGN ESSENTIAL HTN: Primary | ICD-10-CM

## 2024-02-26 PROCEDURE — 1036F TOBACCO NON-USER: CPT | Performed by: FAMILY MEDICINE

## 2024-02-26 PROCEDURE — 99214 OFFICE O/P EST MOD 30 MIN: CPT | Performed by: FAMILY MEDICINE

## 2024-02-26 PROCEDURE — 1160F RVW MEDS BY RX/DR IN RCRD: CPT | Performed by: FAMILY MEDICINE

## 2024-02-26 PROCEDURE — 1125F AMNT PAIN NOTED PAIN PRSNT: CPT | Performed by: FAMILY MEDICINE

## 2024-02-26 PROCEDURE — 3075F SYST BP GE 130 - 139MM HG: CPT | Performed by: FAMILY MEDICINE

## 2024-02-26 PROCEDURE — 3078F DIAST BP <80 MM HG: CPT | Performed by: FAMILY MEDICINE

## 2024-02-26 PROCEDURE — 1159F MED LIST DOCD IN RCRD: CPT | Performed by: FAMILY MEDICINE

## 2024-05-21 DIAGNOSIS — R30.0 DYSURIA: ICD-10-CM

## 2024-05-22 ENCOUNTER — LAB (OUTPATIENT)
Dept: LAB | Facility: LAB | Age: 79
End: 2024-05-22
Payer: MEDICARE

## 2024-05-22 DIAGNOSIS — R30.0 DYSURIA: ICD-10-CM

## 2024-05-22 DIAGNOSIS — R31.9 URINARY TRACT INFECTION WITH HEMATURIA, SITE UNSPECIFIED: Primary | ICD-10-CM

## 2024-05-22 DIAGNOSIS — N39.0 URINARY TRACT INFECTION WITH HEMATURIA, SITE UNSPECIFIED: Primary | ICD-10-CM

## 2024-05-22 LAB
APPEARANCE UR: ABNORMAL
BACTERIA #/AREA URNS AUTO: ABNORMAL /HPF
BILIRUB UR STRIP.AUTO-MCNC: NEGATIVE MG/DL
COLOR UR: ABNORMAL
GLUCOSE UR STRIP.AUTO-MCNC: NEGATIVE MG/DL
KETONES UR STRIP.AUTO-MCNC: NEGATIVE MG/DL
LEUKOCYTE ESTERASE UR QL STRIP.AUTO: ABNORMAL
NITRITE UR QL STRIP.AUTO: POSITIVE
PH UR STRIP.AUTO: 5 [PH]
PROT UR STRIP.AUTO-MCNC: ABNORMAL MG/DL
RBC # UR STRIP.AUTO: ABNORMAL /UL
RBC #/AREA URNS AUTO: >20 /HPF
SP GR UR STRIP.AUTO: 1.02
SQUAMOUS #/AREA URNS AUTO: ABNORMAL /HPF
UROBILINOGEN UR STRIP.AUTO-MCNC: <2 MG/DL
WBC #/AREA URNS AUTO: >50 /HPF
WBC CLUMPS #/AREA URNS AUTO: ABNORMAL /HPF

## 2024-05-22 PROCEDURE — 87086 URINE CULTURE/COLONY COUNT: CPT

## 2024-05-22 PROCEDURE — 87186 SC STD MICRODIL/AGAR DIL: CPT

## 2024-05-22 PROCEDURE — 81001 URINALYSIS AUTO W/SCOPE: CPT

## 2024-05-22 RX ORDER — CIPROFLOXACIN 500 MG/1
500 TABLET ORAL 2 TIMES DAILY
Qty: 10 TABLET | Refills: 0 | Status: SHIPPED | OUTPATIENT
Start: 2024-05-22 | End: 2024-05-27

## 2024-05-25 LAB — BACTERIA UR CULT: ABNORMAL

## 2024-06-03 ENCOUNTER — OFFICE VISIT (OUTPATIENT)
Dept: PAIN MEDICINE | Facility: HOSPITAL | Age: 79
End: 2024-06-03
Payer: MEDICARE

## 2024-06-03 VITALS
TEMPERATURE: 98 F | HEIGHT: 68 IN | BODY MASS INDEX: 23.49 KG/M2 | RESPIRATION RATE: 16 BRPM | SYSTOLIC BLOOD PRESSURE: 139 MMHG | HEART RATE: 77 BPM | WEIGHT: 155 LBS | DIASTOLIC BLOOD PRESSURE: 78 MMHG

## 2024-06-03 DIAGNOSIS — M48.062 LUMBAR STENOSIS WITH NEUROGENIC CLAUDICATION: ICD-10-CM

## 2024-06-03 DIAGNOSIS — Z79.899 MEDICATION MANAGEMENT: ICD-10-CM

## 2024-06-03 DIAGNOSIS — M54.16 CHRONIC LUMBAR RADICULOPATHY: Primary | ICD-10-CM

## 2024-06-03 DIAGNOSIS — M96.1 POSTLAMINECTOMY SYNDROME, LUMBAR: ICD-10-CM

## 2024-06-03 LAB
AMPHETAMINES UR QL SCN: NORMAL
BARBITURATES UR QL SCN: NORMAL
BZE UR QL SCN: NORMAL
CANNABINOIDS UR QL SCN: NORMAL
CREAT UR-MCNC: 76.8 MG/DL (ref 20–370)
PCP UR QL SCN: NORMAL

## 2024-06-03 PROCEDURE — 80346 BENZODIAZEPINES1-12: CPT | Performed by: NURSE PRACTITIONER

## 2024-06-03 PROCEDURE — 99213 OFFICE O/P EST LOW 20 MIN: CPT | Performed by: NURSE PRACTITIONER

## 2024-06-03 PROCEDURE — 80307 DRUG TEST PRSMV CHEM ANLYZR: CPT | Mod: 91,MUE | Performed by: NURSE PRACTITIONER

## 2024-06-03 PROCEDURE — 80355 GABAPENTIN NON-BLOOD: CPT | Performed by: NURSE PRACTITIONER

## 2024-06-03 ASSESSMENT — ENCOUNTER SYMPTOMS
ALLERGIC/IMMUNOLOGIC NEGATIVE: 1
RESPIRATORY NEGATIVE: 1
JOINT SWELLING: 0
ARTHRALGIAS: 1
NECK STIFFNESS: 0
ENDOCRINE NEGATIVE: 1
EYES NEGATIVE: 1
NEUROLOGICAL NEGATIVE: 1
GASTROINTESTINAL NEGATIVE: 1
PSYCHIATRIC NEGATIVE: 1
NECK PAIN: 0
CONSTITUTIONAL NEGATIVE: 1
BACK PAIN: 1
MYALGIAS: 1
CARDIOVASCULAR NEGATIVE: 1

## 2024-06-03 ASSESSMENT — PAIN SCALES - GENERAL: PAINLEVEL: 8

## 2024-06-03 NOTE — PROGRESS NOTES
Subjective   Patient ID: Diego Dumont is a 79 y.o. male who presents for Follow-up (Lower back pain).    Diego is a pleasant 79-year-old  male who is here for follow-up visit.  Patient is ambulatory.  Gait is steady.  He arrives by himself.    Patient continues to have chronic lower back pain and numbness to his feet.  He rates his pain as 8 out of 10.  Pain can be constant or comes and goes depending on his activities.  He describes his pain as burning.  He has numbness, pins and needle sensation to his legs and to his feet.  Back pain and leg pain is aggravated with prolonged standing, walking or sitting.  He reports prolonged sitting or standing makes his legs go numb from the waist down.  He denies leg weakness or change in balance.  He denies bowel or bladder incontinence.  He denies recent falls, injuries, or ER visits.  There has been no change since he was last seen.    Patient continues to take naproxen as needed for pain relief.  He takes gabapentin 600 mg in the morning and 900 mg at night.  I checked his CMP that was done on 12/20/2023.  GFR and creatinine are within normal limit.     Patient is losing weight.  He reports he is watching his diet.  He thinks he may have lost more than 10 pounds for the past 6 months.    Patient had caudal LEO in June of last year that worked well.  I discussed the plan of care including pharmacologic and joint interventional procedure.  Patient would like the caudal LEO repeated.  This is done under fluoroscopy.  He would like it with sedation due to the pain.  Questions were answered during this encounter.      ----------  PROCEDURES:    6/12/2023: Caudal LEO  6/27/2022: Caudal LEO #2  6/6/2022: Caudal LEO #1  ------          OARRS:  Kristie Ho, VIDA-CNP, DNP on 6/3/2024 12:35 PM  I have personally reviewed the OARRS report for Diego Dumont. I have considered the risks of abuse, dependence, addiction and diversion    Past Medical History  He has a past  medical history of Localized swelling, mass and lump, head (08/21/2014), Neoplasm of unspecified behavior of bone, soft tissue, and skin (08/26/2014), Personal history of other diseases of the circulatory system (08/15/2014), and Personal history of other endocrine, nutritional and metabolic disease (08/15/2014).    Surgical History  Past Surgical History:   Procedure Laterality Date    CARPAL TUNNEL RELEASE  08/15/2014    Neuroplasty Decompression Median Nerve At Carpal Tunnel    OTHER SURGICAL HISTORY  08/15/2014    Laminectomy Decompressive Up To Two Lumbar Segments    OTHER SURGICAL HISTORY  06/23/2022    Hip replacement    OTHER SURGICAL HISTORY  05/11/2020    Cataract surgery    OTHER SURGICAL HISTORY  07/10/2019    Carpal tunnel surgery    OTHER SURGICAL HISTORY  09/18/2014    Biopsy Of Soft Tissue Of The Neck        Social History  He reports that he has never smoked. He has never been exposed to tobacco smoke. He has never used smokeless tobacco. He reports that he does not drink alcohol and does not use drugs.    Family History  No family history on file.     Allergies  Patient has no known allergies.      Current Outpatient Medications:     acetaminophen (Tylenol 8 HOUR) 650 mg ER tablet, Take 1 tablet (650 mg) by mouth 3 times a day as needed., Disp: , Rfl:     cycloSPORINE (Restasis) 0.05 % ophthalmic emulsion, Administer into affected eye(s) every 12 hours., Disp: , Rfl:     diclofenac sodium 3 % gel, Apply sparingly to affected areas twice a day, Disp: , Rfl:     finasteride (Proscar) 5 mg tablet, Take 1 tablet (5 mg) by mouth once daily., Disp: 90 tablet, Rfl: 1    gabapentin (Neurontin) 600 mg tablet, TAKE ONE TABLET IN THE MORNING AND TAKE TWO TABLETS AT BEDTIME, Disp: 270 tablet, Rfl: 3    lidocaine 4 % cream, USE TOPICALLY AS DIRECTED., Disp: , Rfl:     lisinopril 10 mg tablet, Take 1 tablet (10 mg) by mouth once daily., Disp: 90 tablet, Rfl: 1    naproxen (Naprosyn) 500 mg tablet, Take 1 tablet  (500 mg) by mouth 2 times a day., Disp: 180 tablet, Rfl: 1    pravastatin (Pravachol) 40 mg tablet, Take 1 tablet (40 mg) by mouth once daily., Disp: 90 tablet, Rfl: 3    tamsulosin (Flomax) 0.4 mg 24 hr capsule, Take 1 capsule (0.4 mg) by mouth once daily at bedtime., Disp: 90 capsule, Rfl: 3     Review of Systems   Constitutional: Negative.    HENT: Negative.     Eyes: Negative.    Respiratory: Negative.     Cardiovascular: Negative.    Gastrointestinal: Negative.    Endocrine: Negative.    Genitourinary: Negative.    Musculoskeletal:  Positive for arthralgias, back pain and myalgias. Negative for gait problem, joint swelling, neck pain and neck stiffness.   Skin: Negative.    Allergic/Immunologic: Negative.    Neurological: Negative.    Psychiatric/Behavioral: Negative.          Physical Exam  Vitals and nursing note reviewed.   HENT:      Head: Normocephalic.      Nose: Nose normal.   Eyes:      Extraocular Movements: Extraocular movements intact.      Conjunctiva/sclera: Conjunctivae normal.      Pupils: Pupils are equal, round, and reactive to light.   Cardiovascular:      Rate and Rhythm: Normal rate and regular rhythm.   Pulmonary:      Effort: Pulmonary effort is normal.      Breath sounds: Normal breath sounds.   Musculoskeletal:         General: Tenderness present. No swelling, deformity or signs of injury.      Cervical back: No rigidity or tenderness.      Lumbar back: Tenderness present.      Right lower leg: No edema.      Left lower leg: No edema.      Comments: Negative leg raise.  Positive for paraspinal tenderness at the lumbar region bilaterally at L4-L5, L5-S1 with rotation.  With nonspecific radicular symptoms.  Presence of a small scar at the lumbar spine from previous surgery.  BUE 4/5, BLE 4/5.   Skin:     General: Skin is warm and dry.   Neurological:      General: No focal deficit present.      Mental Status: He is alert and oriented to person, place, and time.   Psychiatric:         Mood  and Affect: Mood normal.         Behavior: Behavior normal.          Last Recorded Vitals  /78   Pulse 77   Temp 36.7 °C (98 °F) (Temporal)   Resp 16   Wt 70.3 kg (155 lb)     Relevant Results      Pain Management Panel  More data exists         Latest Ref Rng & Units 1/17/2024 5/31/2023   Pain Management Panel   Amphetamine Screen, Urine Presumptive Negative Presumptive Negative  PRESUMPTIVE NEGATIVE  CANCELED    Barbiturate Screen, Urine Presumptive Negative Presumptive Negative  PRESUMPTIVE NEGATIVE  CANCELED    Benzodiazepines Screen, Urine Presumptive Negative Presumptive Negative  -   Fentanyl Screen, Urine Presumptive Negative Presumptive Negative  PRESUMPTIVE NEGATIVE  CANCELED    Methadone Screen, Urine NEGATIVE - PRESUMPTIVE NEGATIVE  CANCELED            Media Information          -----------------    Narrative & Impression   MRN: [id number]  Patient Name: IVONE BEGUM     STUDY:  NR MR L-SPINE WO/W CONTRAST; 11/20/2018 2:28 pm     INDICATION:  Signs/Symptoms: 73-year-old  male with a previous history of  lumbar laminectomy 1979. Patient developed severe low back pain  associated with flexion extension and rotation as well is severe  bilateral idiopathic neuropathy of his lower extremity.     COMPARISON:  None.     ACCESSION NUMBER(S):  [id number]     ORDERING CLINICIAN:  GUSTABO BAZZI     TECHNIQUE:  The lumbar spine was studied in the sagital, axial and coronal planes  utiliing T1 and T2 weighted images. Postcontrast enhanced examination  was also performed following intravenous injection of 15 cc MultiHance     FINDINGS:  The marrow signal and vertebral body height are normal. The conus and  sacrum are normal.  Images at each interspace reveal the following:  L1/L2  There is normal alignment and vertebral body height. The disc space  is normal. There is no evidence of canal or foraminal narrowing.  There is no evidence of bulging or herniated disc.  L2/L3  Loss of height and signal  at the intervertebral disc space.  Flattening of the thecal sac which measures approximately 8 mm in AP  dimension     Severe bilateral foraminal stenosis  L3/L4  Circumferential bulging intervertebral disc. Flattening of the thecal  sac which measures approximately 5 mm in AP dimension in the midline.  Severe narrowing of the lateral recesses and neural foramina  bilaterally  L4/L5  Circumferential bulging intervertebral disc. 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 Likely  synovial cyst arising from the left-sided facet joint best  appreciated on postcontrast enhanced image 15/64. The thecal sac is  narrowed to less than 5 mm in diameter  L5/S1  Bilateral facet hypertrophy and marginal osteophyte formation. No  measurable canal stenosis. Bilateral foraminal narrowing.        IMPRESSION:  *Severe lumbar canal stenosis at L3/L4 and L4/L5 due to spondylosis  *Suspected synovial cyst arising from the left-sided facet joint at  L4/L5 contributes to additional narrowing at this level.   -----------------        Assessment/Plan   Problem List Items Addressed This Visit             ICD-10-CM    Lumbar stenosis with neurogenic claudication M48.062    Relevant Orders    FL fluoro images no charge    Epidural Steroid Injection    Postlaminectomy syndrome, lumbar M96.1    Relevant Orders    FL fluoro images no charge    Epidural Steroid Injection    Chronic lumbar radiculopathy - Primary M54.16    Relevant Orders    FL fluoro images no charge    Epidural Steroid Injection     Other Visit Diagnoses         Codes    Medication management     Z79.899    Relevant Orders    Gabapentin,Urine    Opiate/Opioid/Benzo Prescription Compliance    OOB Internal Tracking                   1. Chronic lumbar radiculopathy  - FL fluoro images no charge; Future  - Epidural Steroid Injection; Future    2. Lumbar stenosis with neurogenic claudication  - FL fluoro images no charge; Future  - Epidural  Steroid Injection; Future    3. Postlaminectomy syndrome, lumbar  - FL fluoro images no charge; Future  - Epidural Steroid Injection; Future    4. Medication management  - Gabapentin,Urine  - Opiate/Opioid/Benzo Prescription Compliance  - OOB Internal Tracking       Plan/Follow-up Instructions:     Continue taking naproxen as needed for pain relief.  Do not take any other NSAIDs like ibuprofen or Aleve.  Take it with meals.    Continue taking gabapentin 600 mg in the morning and 900 mg at night.    ---------------    Your next appointment is with Dr. Orr in:    [facility]    For pain block/injection on: 7/15/2024, caudal epidural steroid injection    SEDATION: You must NOT eat or drink 6 hours before the procedure and you must have a  with you to take you home if planning to have a sedation with your block.    No naproxen or aspirin this day.    °You will receive a phone call the weekday before your appointment/procedure.   °Please KEEP your scheduled appointments.     °BRING your photo ID, insurance information, and a correct list of your home medications to EVERY visit.    °Please call our office at [phone] if you have any questions.     You will then be seen for a postprocedure follow-up in   8 to 12 weeks    You will receive Cipro before the procedure due to your history of right total hip replacement.    ---------------        Disclaimer: This note was created using voice recognition software. It was not corrected for typographical or grammatical errors, inadvertent word insertion, or any unintended errors. Please feel free to contact me for clarification.      Time     Prep time on date of the patient encounter: 2  Time spent directly with patient/family/caregiver: 25   Documentation time: 2  Total time on date of patient encounter: 29        Kristie Ho DNP, APRN, FNP-C    [facility]  Office #: [phone]  Fax # [phone]

## 2024-06-03 NOTE — PROGRESS NOTES
Last urine drug screening date/ordered today: 06/03/24     Results of last screen: as expected      Last opioid risk screening date/ordered today: 1/17/2024      Pain Scale Screening:   Pain Assessment and Documentation Tool (PADT)   Date of Assessment: 6/3/2024  Analgesia:   Patient reports her pain level on average during the past week is 2on a 0 - 10 scale.   Patient reports that her pain level at its worst during the past week was 8 on a 0 -10 scale.   50% of pain has been relieved during the past week per patient   Patient states that the amount of pain relief she is now obtaining from her current pain reliever(s) is enough to make a real difference in her life.   Query to clinician: Is the patient's pain relief clinically significant? yes  Activities of Daily Living:   Physical functioning: unchanged  Family relationships: unchanged  Social relationships: unchanged  Mood: unchanged  Sleep patterns: unchanged  Overall functioning: unchanged  Adverse Events: No, Diego Dumont is not experiencing side effects from current pain reliever.  Patients overall severity of side effect:none  Specific Analgesic Plan: Continue present regimen.

## 2024-06-03 NOTE — PATIENT INSTRUCTIONS
Continue taking naproxen as needed for pain relief.  Do not take any other NSAIDs like ibuprofen or Aleve.  Take it with meals.    Continue taking gabapentin 600 mg in the morning and 900 mg at night.    ---------------    Your next appointment is with Dr. Orr in:    [facility]    For pain block/injection on: 7/15/2024, caudal epidural steroid injection    SEDATION: You must NOT eat or drink 6 hours before the procedure and you must have a  with you to take you home if planning to have a sedation with your block.    No naproxen or aspirin this day.    °You will receive a phone call the weekday before your appointment/procedure.   °Please KEEP your scheduled appointments.     °BRING your photo ID, insurance information, and a correct list of your home medications to EVERY visit.    °Please call our office at [phone] if you have any questions.     You will then be seen for a postprocedure follow-up in   8 to 12 weeks    You will receive Cipro before the procedure due to your history of right total hip replacement.    ---------------

## 2024-06-06 LAB
1OH-MIDAZOLAM UR CFM-MCNC: <25 NG/ML
6MAM UR CFM-MCNC: <25 NG/ML
7AMINOCLONAZEPAM UR CFM-MCNC: <25 NG/ML
A-OH ALPRAZ UR CFM-MCNC: <25 NG/ML
ALPRAZ UR CFM-MCNC: <25 NG/ML
CHLORDIAZEP UR CFM-MCNC: <25 NG/ML
CLONAZEPAM UR CFM-MCNC: <25 NG/ML
CODEINE UR CFM-MCNC: <50 NG/ML
DIAZEPAM UR CFM-MCNC: <25 NG/ML
EDDP UR CFM-MCNC: <25 NG/ML
FENTANYL UR CFM-MCNC: <2.5 NG/ML
GABAPENTIN UR-MCNC: 324.3 UG/ML
HYDROCODONE CTO UR CFM-MCNC: <25 NG/ML
HYDROMORPHONE UR CFM-MCNC: <25 NG/ML
LORAZEPAM UR CFM-MCNC: <25 NG/ML
METHADONE UR CFM-MCNC: <25 NG/ML
MIDAZOLAM UR CFM-MCNC: <25 NG/ML
MORPHINE UR CFM-MCNC: <50 NG/ML
NORDIAZEPAM UR CFM-MCNC: <25 NG/ML
NORFENTANYL UR CFM-MCNC: <2.5 NG/ML
NORHYDROCODONE UR CFM-MCNC: <25 NG/ML
NOROXYCODONE UR CFM-MCNC: <25 NG/ML
NORTRAMADOL UR-MCNC: <50 NG/ML
OXAZEPAM UR CFM-MCNC: <25 NG/ML
OXYCODONE UR CFM-MCNC: <25 NG/ML
OXYMORPHONE UR CFM-MCNC: <25 NG/ML
TEMAZEPAM UR CFM-MCNC: <25 NG/ML
TRAMADOL UR CFM-MCNC: <50 NG/ML
ZOLPIDEM UR CFM-MCNC: <25 NG/ML
ZOLPIDEM UR-MCNC: <25 NG/ML

## 2024-06-19 ENCOUNTER — LAB (OUTPATIENT)
Dept: LAB | Facility: LAB | Age: 79
End: 2024-06-19
Payer: MEDICARE

## 2024-06-19 DIAGNOSIS — R31.9 URINARY TRACT INFECTION WITH HEMATURIA, SITE UNSPECIFIED: ICD-10-CM

## 2024-06-19 DIAGNOSIS — N39.0 URINARY TRACT INFECTION WITH HEMATURIA, SITE UNSPECIFIED: ICD-10-CM

## 2024-06-19 DIAGNOSIS — N30.01 ACUTE CYSTITIS WITH HEMATURIA: Primary | ICD-10-CM

## 2024-06-19 LAB
APPEARANCE UR: ABNORMAL
BACTERIA #/AREA URNS AUTO: ABNORMAL /HPF
BILIRUB UR STRIP.AUTO-MCNC: NEGATIVE MG/DL
COLOR UR: ABNORMAL
GLUCOSE UR STRIP.AUTO-MCNC: NEGATIVE MG/DL
KETONES UR STRIP.AUTO-MCNC: NEGATIVE MG/DL
LEUKOCYTE ESTERASE UR QL STRIP.AUTO: ABNORMAL
NITRITE UR QL STRIP.AUTO: NEGATIVE
PH UR STRIP.AUTO: 5.5 [PH]
PROT UR STRIP.AUTO-MCNC: ABNORMAL MG/DL
RBC # UR STRIP.AUTO: ABNORMAL /UL
RBC #/AREA URNS AUTO: >20 /HPF
SP GR UR STRIP.AUTO: 1.02
SQUAMOUS #/AREA URNS AUTO: ABNORMAL /HPF
TRANS CELLS #/AREA UR COMP ASSIST: ABNORMAL /HPF
UROBILINOGEN UR STRIP.AUTO-MCNC: ABNORMAL MG/DL
WBC #/AREA URNS AUTO: ABNORMAL /HPF
WBC CLUMPS #/AREA URNS AUTO: ABNORMAL /HPF

## 2024-06-19 PROCEDURE — 87186 SC STD MICRODIL/AGAR DIL: CPT

## 2024-06-19 PROCEDURE — 87086 URINE CULTURE/COLONY COUNT: CPT

## 2024-06-19 PROCEDURE — 81001 URINALYSIS AUTO W/SCOPE: CPT

## 2024-06-19 RX ORDER — CIPROFLOXACIN 500 MG/1
500 TABLET ORAL 2 TIMES DAILY
Qty: 10 TABLET | Refills: 0 | Status: SHIPPED | OUTPATIENT
Start: 2024-06-19 | End: 2024-06-24

## 2024-06-20 DIAGNOSIS — M54.16 CHRONIC LUMBAR RADICULOPATHY: ICD-10-CM

## 2024-06-22 LAB — BACTERIA UR CULT: ABNORMAL

## 2024-07-14 DIAGNOSIS — I10 ESSENTIAL (PRIMARY) HYPERTENSION: ICD-10-CM

## 2024-07-14 DIAGNOSIS — N40.0 BENIGN PROSTATIC HYPERPLASIA WITHOUT LOWER URINARY TRACT SYMPTOMS: ICD-10-CM

## 2024-07-15 ENCOUNTER — HOSPITAL ENCOUNTER (OUTPATIENT)
Dept: PAIN MEDICINE | Facility: HOSPITAL | Age: 79
Discharge: HOME | End: 2024-07-15
Payer: MEDICARE

## 2024-07-15 ENCOUNTER — HOSPITAL ENCOUNTER (OUTPATIENT)
Dept: RADIOLOGY | Facility: HOSPITAL | Age: 79
Discharge: HOME | End: 2024-07-15
Payer: MEDICARE

## 2024-07-15 VITALS
SYSTOLIC BLOOD PRESSURE: 150 MMHG | WEIGHT: 159 LBS | DIASTOLIC BLOOD PRESSURE: 76 MMHG | OXYGEN SATURATION: 95 % | BODY MASS INDEX: 24.1 KG/M2 | TEMPERATURE: 97.3 F | HEART RATE: 92 BPM | HEIGHT: 68 IN | RESPIRATION RATE: 16 BRPM

## 2024-07-15 DIAGNOSIS — M96.1 POSTLAMINECTOMY SYNDROME, LUMBAR: ICD-10-CM

## 2024-07-15 DIAGNOSIS — M48.062 LUMBAR STENOSIS WITH NEUROGENIC CLAUDICATION: ICD-10-CM

## 2024-07-15 DIAGNOSIS — M54.16 CHRONIC LUMBAR RADICULOPATHY: ICD-10-CM

## 2024-07-15 DIAGNOSIS — M25.511 ACUTE PAIN OF RIGHT SHOULDER: ICD-10-CM

## 2024-07-15 PROCEDURE — 2500000001 HC RX 250 WO HCPCS SELF ADMINISTERED DRUGS (ALT 637 FOR MEDICARE OP): Performed by: NURSE PRACTITIONER

## 2024-07-15 PROCEDURE — 2550000001 HC RX 255 CONTRASTS: Performed by: ANESTHESIOLOGY

## 2024-07-15 PROCEDURE — 2500000004 HC RX 250 GENERAL PHARMACY W/ HCPCS (ALT 636 FOR OP/ED): Performed by: NURSE PRACTITIONER

## 2024-07-15 PROCEDURE — G0500 MOD SEDAT ENDO SERVICE >5YRS: HCPCS | Performed by: ANESTHESIOLOGY

## 2024-07-15 PROCEDURE — 73030 X-RAY EXAM OF SHOULDER: CPT | Mod: RIGHT SIDE | Performed by: RADIOLOGY

## 2024-07-15 PROCEDURE — 2500000005 HC RX 250 GENERAL PHARMACY W/O HCPCS: Performed by: ANESTHESIOLOGY

## 2024-07-15 PROCEDURE — 62323 NJX INTERLAMINAR LMBR/SAC: CPT | Performed by: ANESTHESIOLOGY

## 2024-07-15 PROCEDURE — 2500000004 HC RX 250 GENERAL PHARMACY W/ HCPCS (ALT 636 FOR OP/ED): Performed by: ANESTHESIOLOGY

## 2024-07-15 PROCEDURE — 73030 X-RAY EXAM OF SHOULDER: CPT | Mod: RT

## 2024-07-15 RX ORDER — LIDOCAINE HYDROCHLORIDE 5 MG/ML
INJECTION, SOLUTION INFILTRATION; INTRAVENOUS AS NEEDED
Status: COMPLETED | OUTPATIENT
Start: 2024-07-15 | End: 2024-07-15

## 2024-07-15 RX ORDER — MIDAZOLAM HYDROCHLORIDE 1 MG/ML
INJECTION, SOLUTION INTRAMUSCULAR; INTRAVENOUS AS NEEDED
Status: COMPLETED | OUTPATIENT
Start: 2024-07-15 | End: 2024-07-15

## 2024-07-15 RX ORDER — SODIUM CHLORIDE, SODIUM LACTATE, POTASSIUM CHLORIDE, CALCIUM CHLORIDE 600; 310; 30; 20 MG/100ML; MG/100ML; MG/100ML; MG/100ML
100 INJECTION, SOLUTION INTRAVENOUS CONTINUOUS
Status: DISCONTINUED | OUTPATIENT
Start: 2024-07-15 | End: 2024-07-16 | Stop reason: HOSPADM

## 2024-07-15 RX ORDER — METHYLPREDNISOLONE ACETATE 80 MG/ML
INJECTION, SUSPENSION INTRA-ARTICULAR; INTRALESIONAL; INTRAMUSCULAR; SOFT TISSUE AS NEEDED
Status: COMPLETED | OUTPATIENT
Start: 2024-07-15 | End: 2024-07-15

## 2024-07-15 RX ORDER — FINASTERIDE 5 MG/1
5 TABLET, FILM COATED ORAL DAILY
Qty: 90 TABLET | Refills: 1 | Status: SHIPPED | OUTPATIENT
Start: 2024-07-15

## 2024-07-15 RX ORDER — FENTANYL CITRATE 50 UG/ML
INJECTION, SOLUTION INTRAMUSCULAR; INTRAVENOUS AS NEEDED
Status: COMPLETED | OUTPATIENT
Start: 2024-07-15 | End: 2024-07-15

## 2024-07-15 RX ORDER — LISINOPRIL 10 MG/1
10 TABLET ORAL DAILY
Qty: 90 TABLET | Refills: 1 | Status: SHIPPED | OUTPATIENT
Start: 2024-07-15

## 2024-07-15 RX ORDER — CIPROFLOXACIN 500 MG/1
500 TABLET ORAL ONCE
Status: COMPLETED | OUTPATIENT
Start: 2024-07-15 | End: 2024-07-15

## 2024-07-15 ASSESSMENT — PAIN - FUNCTIONAL ASSESSMENT
PAIN_FUNCTIONAL_ASSESSMENT: 0-10

## 2024-07-15 ASSESSMENT — COLUMBIA-SUICIDE SEVERITY RATING SCALE - C-SSRS
2. HAVE YOU ACTUALLY HAD ANY THOUGHTS OF KILLING YOURSELF?: NO
1. IN THE PAST MONTH, HAVE YOU WISHED YOU WERE DEAD OR WISHED YOU COULD GO TO SLEEP AND NOT WAKE UP?: NO
6. HAVE YOU EVER DONE ANYTHING, STARTED TO DO ANYTHING, OR PREPARED TO DO ANYTHING TO END YOUR LIFE?: NO

## 2024-07-15 ASSESSMENT — PAIN SCALES - GENERAL
PAINLEVEL_OUTOF10: 10 - WORST POSSIBLE PAIN
PAINLEVEL_OUTOF10: 0 - NO PAIN

## 2024-07-15 NOTE — DISCHARGE INSTRUCTIONS
No heavy lifting or strenuous activity today.  Do not sign important documents.  Do not drive for 24 hours.  Keep bandage on for 24 hours.  Keep injection site clean and dry, it may be sore for up to 48 hours.  Do not smoke or drink alcohol for 24 hours.   For relief of pain and swelling, you may apply ice to the injection site area.  If pain persist you may apply moist heat.  Common side effects of steroids include insomnia, facial flushing, increased appetite, headaches, sweating, fluid retention. If you have diabetes your blood sugar may increase. Please monitor your diet and your blood sugar should return to normal in a few days. If your sugar becomes dangerously high, please contact your physician that manages your diabetes for further guidance.  Rare side effects include infection, bleeding, nerve damage. If you have fever, redness or swelling near site, severe pain, please go to the nearest emergency room. For other questions please call office at [phone]. Local anesthetics wear off in several hours and duration of relief vary from person to person.    Get your shoulder xray done. Take naproxen 500 mg twice daily. You may also take Tylenol.    Follow up with Kristie Ho on 9/11 @ 9:00 am in [place].

## 2024-07-15 NOTE — H&P
History Of Present Illness  Diego Dumont is a 79 y.o. male presenting for caudal epidural steroid injection under sedation today.  Patient has chronic back pain that radiates numbness to his feet.  Sometimes it is constant sometimes it comes and goes.  He has a burning pain in his lower back and pins-and-needles in his feet prolonged sitting or standing makes it worse.  Patient notes he woke up with a small cough this morning.  Denies any productive sputum.  Denies fevers or chills.  Denies shortness of breath or chest pain.  Additionally, patient was screwing in a metal frame a few days ago and woke up with significant right shoulder pain and inability to lift his arm over his head.  He states the pain is debilitating.  The pain radiates up into his trapezius.  He continues to take gabapentin for pain.  He also takes half a pill of naproxen in the morning and half at night as needed.  He would like to proceed with injection today despite his other recent issues.     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    Personal history of other diseases of the circulatory system 08/15/2014    History of hypertension    Personal history of other endocrine, nutritional and metabolic disease 08/15/2014    History of hyperlipidemia       Surgical History  Past Surgical History:   Procedure Laterality Date    CARPAL TUNNEL RELEASE  08/15/2014    Neuroplasty Decompression Median Nerve At Carpal Tunnel    OTHER SURGICAL HISTORY  08/15/2014    Laminectomy Decompressive Up To Two Lumbar Segments    OTHER SURGICAL HISTORY  06/23/2022    Hip replacement    OTHER SURGICAL HISTORY  05/11/2020    Cataract surgery    OTHER SURGICAL HISTORY  07/10/2019    Carpal tunnel surgery    OTHER SURGICAL HISTORY  09/18/2014     Biopsy Of Soft Tissue Of The Neck        Social History  He reports that he has never smoked. He has never been exposed to tobacco smoke. He has never used smokeless tobacco. He reports that he does not drink alcohol and does not use drugs.    Family History  No family history on file.     Allergies  Patient has no known allergies.    Review of Systems  Review of Systems  A complete review of systems was conducted, pertinent only to the HPI noted above.     Constitutional: None     Eyes: No additions to above history     Ears, Nose, Throat: No additions to above history     Cardiovascular: No additions to above history     Respiratory: No additions to above history     GI: No additions to above history     : No additions to above history     Skin/Neuro: No additions to above history     Endocrine/Heme/Lymph: No additions to above history     Immunologic: No additions to above history     Psychiatric: No additions to above history     Musculoskeletal: see above    Physical Exam  General: Well developed, awake/alert/oriented x3, no distress, alert and cooperative.    Skin: Warm and dry, chronic ecchymotic changes    Eyes: EOMI    Head/neck: No apparent injury    Cardiac: Regular rate and rhythm, no murmurs    Respiratory: Clear to auscultation bilaterally    GI: Nontender, nondistended    Extremities: No edema or deformity    MSK:   Positive for paraspinal tenderness at the lumbar region bilaterally at L4-L5, L5-S1 with rotation.   Right shoulder: Negative drop arm test, positive empty can, [facility]. + crepitus. Passive  degrees flexion.  External and internal rotation limited.  Neurovascularly intact distally.    Neuro: Shuffling gait    Psych: Appropriate mood    Last Recorded Vitals  Vitals:    07/15/24 1007   BP: 133/81   Pulse: 99   Resp: 17   Temp: 36.9 °C (98.5 °F)   SpO2: 99%                        Assessment/Plan   Chronic lumbosacral radiculopathy  -Caudal epidural steroid injection with  sedation  Right rotator cuff tendinopathy versus partial tear, possible labral tear, likely degenerative changes  -Will order x-ray right shoulder  -Recommended taking 500 mg naproxen twice daily as opposed to 250 mg twice daily as he has been, also recommended Tylenol supplementation  Cough  -No systemic symptoms lungs are clear on exam, no treatment for now       I spent 25 minutes in the professional and overall care of this patient.      Ben Lopez PA-C

## 2024-07-16 ENCOUNTER — APPOINTMENT (OUTPATIENT)
Dept: RADIOLOGY | Facility: HOSPITAL | Age: 79
End: 2024-07-16
Payer: MEDICARE

## 2024-07-16 ENCOUNTER — APPOINTMENT (OUTPATIENT)
Dept: CARDIOLOGY | Facility: HOSPITAL | Age: 79
End: 2024-07-16
Payer: MEDICARE

## 2024-07-16 ENCOUNTER — HOSPITAL ENCOUNTER (EMERGENCY)
Facility: HOSPITAL | Age: 79
Discharge: OTHER INSTITUTION | End: 2024-07-17
Attending: EMERGENCY MEDICINE
Payer: MEDICARE

## 2024-07-16 DIAGNOSIS — I63.512 CEREBROVASCULAR ACCIDENT (CVA) DUE TO OCCLUSION OF LEFT MIDDLE CEREBRAL ARTERY (MULTI): Primary | ICD-10-CM

## 2024-07-16 LAB
ALBUMIN SERPL BCP-MCNC: 4.1 G/DL (ref 3.4–5)
ALP SERPL-CCNC: 73 U/L (ref 33–136)
ALT SERPL W P-5'-P-CCNC: 22 U/L (ref 10–52)
ANION GAP SERPL CALC-SCNC: 11 MMOL/L (ref 10–20)
APPEARANCE UR: ABNORMAL
AST SERPL W P-5'-P-CCNC: 20 U/L (ref 9–39)
BACTERIA #/AREA URNS AUTO: ABNORMAL /HPF
BASOPHILS # BLD AUTO: 0.05 X10*3/UL (ref 0–0.1)
BASOPHILS NFR BLD AUTO: 0.3 %
BILIRUB SERPL-MCNC: 0.4 MG/DL (ref 0–1.2)
BILIRUB UR STRIP.AUTO-MCNC: NEGATIVE MG/DL
BUN SERPL-MCNC: 30 MG/DL (ref 6–23)
CALCIUM SERPL-MCNC: 9.2 MG/DL (ref 8.6–10.3)
CARDIAC TROPONIN I PNL SERPL HS: 8 NG/L (ref 0–20)
CARDIAC TROPONIN I PNL SERPL HS: 8 NG/L (ref 0–20)
CHLORIDE SERPL-SCNC: 105 MMOL/L (ref 98–107)
CO2 SERPL-SCNC: 25 MMOL/L (ref 21–32)
COLOR UR: YELLOW
CREAT SERPL-MCNC: 1.13 MG/DL (ref 0.5–1.3)
EGFRCR SERPLBLD CKD-EPI 2021: 67 ML/MIN/1.73M*2
EOSINOPHIL # BLD AUTO: 0.09 X10*3/UL (ref 0–0.4)
EOSINOPHIL NFR BLD AUTO: 0.5 %
ERYTHROCYTE [DISTWIDTH] IN BLOOD BY AUTOMATED COUNT: 14.3 % (ref 11.5–14.5)
GLUCOSE BLD MANUAL STRIP-MCNC: 101 MG/DL (ref 74–99)
GLUCOSE SERPL-MCNC: 110 MG/DL (ref 74–99)
GLUCOSE UR STRIP.AUTO-MCNC: NEGATIVE MG/DL
HCT VFR BLD AUTO: 40.5 % (ref 41–52)
HGB BLD-MCNC: 12.8 G/DL (ref 13.5–17.5)
HOLD SPECIMEN: NORMAL
IMM GRANULOCYTES # BLD AUTO: 0.07 X10*3/UL (ref 0–0.5)
IMM GRANULOCYTES NFR BLD AUTO: 0.4 % (ref 0–0.9)
INR PPP: 1.3 (ref 0.9–1.1)
KETONES UR STRIP.AUTO-MCNC: NEGATIVE MG/DL
LEUKOCYTE ESTERASE UR QL STRIP.AUTO: ABNORMAL
LYMPHOCYTES # BLD AUTO: 2.16 X10*3/UL (ref 0.8–3)
LYMPHOCYTES NFR BLD AUTO: 10.8 %
MCH RBC QN AUTO: 27.7 PG (ref 26–34)
MCHC RBC AUTO-ENTMCNC: 31.6 G/DL (ref 32–36)
MCV RBC AUTO: 88 FL (ref 80–100)
MONOCYTES # BLD AUTO: 1.23 X10*3/UL (ref 0.05–0.8)
MONOCYTES NFR BLD AUTO: 6.2 %
MUCOUS THREADS #/AREA URNS AUTO: ABNORMAL /LPF
NEUTROPHILS # BLD AUTO: 16.37 X10*3/UL (ref 1.6–5.5)
NEUTROPHILS NFR BLD AUTO: 81.8 %
NITRITE UR QL STRIP.AUTO: NEGATIVE
NRBC BLD-RTO: 0 /100 WBCS (ref 0–0)
PH UR STRIP.AUTO: 5 [PH]
PLATELET # BLD AUTO: 256 X10*3/UL (ref 150–450)
POTASSIUM SERPL-SCNC: 3.8 MMOL/L (ref 3.5–5.3)
PROT SERPL-MCNC: 8 G/DL (ref 6.4–8.2)
PROT UR STRIP.AUTO-MCNC: NEGATIVE MG/DL
PROTHROMBIN TIME: 14.9 SECONDS (ref 9.8–12.8)
RBC # BLD AUTO: 4.62 X10*6/UL (ref 4.5–5.9)
RBC # UR STRIP.AUTO: ABNORMAL /UL
RBC #/AREA URNS AUTO: >20 /HPF
SODIUM SERPL-SCNC: 137 MMOL/L (ref 136–145)
SP GR UR STRIP.AUTO: 1.02
UROBILINOGEN UR STRIP.AUTO-MCNC: <2 MG/DL
WBC # BLD AUTO: 20 X10*3/UL (ref 4.4–11.3)
WBC #/AREA URNS AUTO: ABNORMAL /HPF

## 2024-07-16 PROCEDURE — 96361 HYDRATE IV INFUSION ADD-ON: CPT

## 2024-07-16 PROCEDURE — 84484 ASSAY OF TROPONIN QUANT: CPT | Performed by: EMERGENCY MEDICINE

## 2024-07-16 PROCEDURE — 2500000004 HC RX 250 GENERAL PHARMACY W/ HCPCS (ALT 636 FOR OP/ED): Mod: TB

## 2024-07-16 PROCEDURE — 96374 THER/PROPH/DIAG INJ IV PUSH: CPT | Mod: 59

## 2024-07-16 PROCEDURE — 70498 CT ANGIOGRAPHY NECK: CPT

## 2024-07-16 PROCEDURE — 93005 ELECTROCARDIOGRAM TRACING: CPT

## 2024-07-16 PROCEDURE — 2500000005 HC RX 250 GENERAL PHARMACY W/O HCPCS: Performed by: EMERGENCY MEDICINE

## 2024-07-16 PROCEDURE — 2500000004 HC RX 250 GENERAL PHARMACY W/ HCPCS (ALT 636 FOR OP/ED): Performed by: EMERGENCY MEDICINE

## 2024-07-16 PROCEDURE — 96376 TX/PRO/DX INJ SAME DRUG ADON: CPT | Mod: 59

## 2024-07-16 PROCEDURE — 84075 ASSAY ALKALINE PHOSPHATASE: CPT | Performed by: EMERGENCY MEDICINE

## 2024-07-16 PROCEDURE — 70450 CT HEAD/BRAIN W/O DYE: CPT | Performed by: RADIOLOGY

## 2024-07-16 PROCEDURE — 82947 ASSAY GLUCOSE BLOOD QUANT: CPT

## 2024-07-16 PROCEDURE — 70496 CT ANGIOGRAPHY HEAD: CPT | Performed by: RADIOLOGY

## 2024-07-16 PROCEDURE — 85025 COMPLETE CBC W/AUTO DIFF WBC: CPT | Performed by: EMERGENCY MEDICINE

## 2024-07-16 PROCEDURE — 85610 PROTHROMBIN TIME: CPT | Performed by: EMERGENCY MEDICINE

## 2024-07-16 PROCEDURE — 82947 ASSAY GLUCOSE BLOOD QUANT: CPT | Mod: 59

## 2024-07-16 PROCEDURE — 2500000005 HC RX 250 GENERAL PHARMACY W/O HCPCS

## 2024-07-16 PROCEDURE — 81001 URINALYSIS AUTO W/SCOPE: CPT | Performed by: EMERGENCY MEDICINE

## 2024-07-16 PROCEDURE — 2550000001 HC RX 255 CONTRASTS: Performed by: EMERGENCY MEDICINE

## 2024-07-16 PROCEDURE — 36415 COLL VENOUS BLD VENIPUNCTURE: CPT | Performed by: EMERGENCY MEDICINE

## 2024-07-16 PROCEDURE — 99291 CRITICAL CARE FIRST HOUR: CPT | Performed by: EMERGENCY MEDICINE

## 2024-07-16 PROCEDURE — 70498 CT ANGIOGRAPHY NECK: CPT | Performed by: RADIOLOGY

## 2024-07-16 PROCEDURE — 2500000004 HC RX 250 GENERAL PHARMACY W/ HCPCS (ALT 636 FOR OP/ED)

## 2024-07-16 PROCEDURE — 70450 CT HEAD/BRAIN W/O DYE: CPT

## 2024-07-16 PROCEDURE — 96375 TX/PRO/DX INJ NEW DRUG ADDON: CPT | Mod: 59

## 2024-07-16 RX ORDER — METOPROLOL TARTRATE 1 MG/ML
5 INJECTION, SOLUTION INTRAVENOUS ONCE
Status: COMPLETED | OUTPATIENT
Start: 2024-07-16 | End: 2024-07-16

## 2024-07-16 RX ORDER — ONDANSETRON HYDROCHLORIDE 2 MG/ML
4 INJECTION, SOLUTION INTRAVENOUS ONCE
Status: COMPLETED | OUTPATIENT
Start: 2024-07-16 | End: 2024-07-16

## 2024-07-16 RX ORDER — METOPROLOL TARTRATE 1 MG/ML
INJECTION, SOLUTION INTRAVENOUS
Status: COMPLETED
Start: 2024-07-16 | End: 2024-07-16

## 2024-07-16 RX ORDER — ONDANSETRON HYDROCHLORIDE 2 MG/ML
INJECTION, SOLUTION INTRAVENOUS
Status: COMPLETED
Start: 2024-07-16 | End: 2024-07-16

## 2024-07-16 RX ORDER — DEXTROSE MONOHYDRATE AND SODIUM CHLORIDE 5; .45 G/100ML; G/100ML
75 INJECTION, SOLUTION INTRAVENOUS CONTINUOUS
Status: DISCONTINUED | OUTPATIENT
Start: 2024-07-16 | End: 2024-07-17 | Stop reason: HOSPADM

## 2024-07-16 ASSESSMENT — PAIN - FUNCTIONAL ASSESSMENT: PAIN_FUNCTIONAL_ASSESSMENT: UNABLE TO SELF-REPORT

## 2024-07-16 NOTE — ED PROVIDER NOTES
[facility]   ED  Provider Note  7/16/2024  6:18 PM  AC08/AC08      Chief Complaint   Patient presents with    Stroke     Last known well at 1700        History of Present Illness:   Diego Dumont is a 79 y.o. male presenting to the ED for clinic symptoms, beginning 5 PM.  The complaint has been persistent, moderate in severity, and worsened by nothing.  Patient is nonverbal does not answer any questions.  He follows some commands.  For example he will hold up his arms and legs to command but cannot answer any questions about sensation orientation or close his eyes to command.      Review of Systems:   Pertinent positives and review of systems as noted above.  Remaining 10 review of systems is negative or noncontributory to today's episode of care.  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    Personal history of other diseases of the circulatory system 08/15/2014    History of hypertension    Personal history of other endocrine, nutritional and metabolic disease 08/15/2014    History of hyperlipidemia        Past Surgical History:   Past Surgical History:   Procedure Laterality Date    CARPAL TUNNEL RELEASE  08/15/2014    Neuroplasty Decompression Median Nerve At Carpal Tunnel    OTHER SURGICAL HISTORY  08/15/2014    Laminectomy Decompressive Up To Two Lumbar Segments    OTHER SURGICAL HISTORY  06/23/2022    Hip replacement    OTHER SURGICAL HISTORY  05/11/2020    Cataract surgery    OTHER SURGICAL HISTORY  07/10/2019    Carpal tunnel surgery    OTHER SURGICAL HISTORY  09/18/2014    Biopsy Of Soft Tissue Of The Neck        Social History:   Social History     Social  History Narrative    Not on file        Family History: family history is not on file. Unless otherwise noted, family history is non contributory    Patient's Medications   New Prescriptions    No medications on file   Previous Medications    ACETAMINOPHEN (TYLENOL 8 HOUR) 650 MG ER TABLET    Take 1 tablet (650 mg) by mouth 3 times a day as needed.    CYCLOSPORINE (RESTASIS) 0.05 % OPHTHALMIC EMULSION    Administer into affected eye(s) every 12 hours.    DICLOFENAC SODIUM 3 % GEL    Apply sparingly to affected areas twice a day    FINASTERIDE (PROSCAR) 5 MG TABLET    TAKE ONE TABLET BY MOUTH ONCE DAILY    GABAPENTIN (NEURONTIN) 600 MG TABLET    TAKE ONE TABLET IN THE MORNING AND TAKE TWO TABLETS AT BEDTIME    LIDOCAINE 4 % CREAM    USE TOPICALLY AS DIRECTED.    LISINOPRIL 10 MG TABLET    TAKE ONE TABLET BY MOUTH ONCE DAILY    NAPROXEN (NAPROSYN) 500 MG TABLET    Take 1 tablet (500 mg) by mouth 2 times a day.    PRAVASTATIN (PRAVACHOL) 40 MG TABLET    Take 1 tablet (40 mg) by mouth once daily.    TAMSULOSIN (FLOMAX) 0.4 MG 24 HR CAPSULE    Take 1 capsule (0.4 mg) by mouth once daily at bedtime.   Modified Medications    No medications on file   Discontinued Medications    No medications on file      The patient’s home medications have been reviewed.    Allergies: Patient has no known allergies.    -------------------------------------------------- RESULTS -------------------------------------------------  All laboratory and radiology results have been personally reviewed by myself   LABS:  Labs Reviewed   POCT GLUCOSE - Abnormal       Result Value    POCT Glucose 101 (*)    GRAY TOP     EKG: Sinus rhythm 82 bpm, right bundle branch block pattern, inferior Q waves are present, no acute ST elevations.  Abnormal EKG.  Interpreted by ROD Magaña MD    RADIOLOGY:  Interpreted by Radiologist.  CT brain attack head wo IV contrast   Final Result   Evidence of acute left MCA territory infarct involving the insular   cortex  "and frontal operculum. Hyperdense sign involving an M2 branch   in the left sylvian fissure, suspicious for thrombosis. CT   angiography is recommended to assess for large vessel occlusion.        No acute intracranial hemorrhage.             MACRO:   Teresa Seals discussed the significance and urgency of this critical   finding by telephone with  FELISHA HAWK on 7/16/2024 at 7:01 pm.   (**-RCF-**) Findings:  See findings.        Signed by: Teresa Seals 7/16/2024 7:06 PM   Dictation workstation:   [id number]      CT brain attack angio head and neck W and WO IV contrast    (Results Pending)       No results found for this or any previous visit (from the past 4464 hour(s)).  ------------------------- NURSING NOTES AND VITALS REVIEWED ---------------------------   The nursing notes within the ED encounter and vital signs as below have been reviewed.   BP (!) 185/93   Pulse 81   Temp 36.6 °C (97.8 °F) (Temporal)   Resp (!) 35   Ht 1.727 m (5' 8\")   Wt 70.4 kg (155 lb 3.3 oz)   SpO2 96%   BMI 23.60 kg/m²   Oxygen Saturation Interpretation: Normal      ---------------------------------------------------PHYSICAL EXAM--------------------------------------  Physical Exam   Constitutional/General: Alert,  well appearing, non toxic in NAD  Head: Normocephalic and atraumatic  Eyes: PERRL, EOMI, conjunctiva normal, sclera non icteric  Mouth: Oropharynx clear, handling secretions, no trismus, no asymmetry of the posterior oropharynx or uvular edema  Neck: Supple, full ROM, non tender to palpation in the midline, no stridor, no crepitus, no meningeal signs  Respiratory: Lungs clear to auscultation bilaterally, no wheezes, rales, or rhonchi. Not in respiratory distress  Cardiovascular:  Regular rate. Regular rhythm. No murmurs, gallops, or rubs. 2+ distal pulses  Chest: No chest wall tenderness  GI:  Abdomen Soft, Non tender, Non distended.  +BS. No organomegaly, no palpable masses,  No rebound, guarding, or " rigidity.   Musculoskeletal: Moves all extremities x 4. Warm and well perfused, no clubbing, cyanosis, or edema. Capillary refill <3 seconds  Integument: skin warm and dry. No rashes.   Lymphatic: no lymphadenopathy noted  Neurologic: No focal deficits, symmetric strength 5/5 in the upper and lower extremities bilaterally  Psychiatric: Normal Affect    Procedures    ------------------------------ ED COURSE/MEDICAL DECISION MAKING----------------------  ED Course as of 07/17/24 1041 Tue Jul 16, 2024 2213 Patient assessed, chart reviewed [MN]   Wed Jul 17, 2024 0050 DISCUSSED WITH DR CHACKO [MN]   0101 DISCUSSED WITH DR YODER, ACCEPTS TRANSFER, CRYOPRICIPITATE ORDERED [MN]      ED Course User Index  [MN] Natalia Powell MD         Diagnoses as of 07/17/24 1041   Cerebrovascular accident (CVA) due to occlusion of left middle cerebral artery (Multi)      [facility] care.    Patient has been treated with tenecteplase for his acute stroke.  I discussed the risk of intracranial and GI bleeding with the family.  Given the severe nature of his inability to comprehend or produce speech it is felt that tenecteplase is indicated.  After administration he has increased strength in the right arm and right leg.  He is still nonverbal.  He is pending transfer at end of shift.    Medical Decision Making:   Transfer  ED Course as of 07/17/24 1041 Tue Jul 16, 2024 2213 Patient assessed, chart reviewed [MN]   Wed Jul 17, 2024 0050 DISCUSSED WITH DR CHACKO [MN]   0101 DISCUSSED WITH DR YODER, ACCEPTS TRANSFER, CRYOPRICIPITATE ORDERED [MN]      ED Course User Index  [MN] Natalia Powell MD         Diagnoses as of 07/17/24 1041   Cerebrovascular accident (CVA) due to occlusion of left middle cerebral artery (Multi)      Counseling:   The emergency provider has spoken with the patient and discussed today’s results, in addition to providing specific details for the  plan of care and counseling regarding the diagnosis and prognosis.  Questions are answered at this time and they are agreeable with the plan.      --------------------------------- IMPRESSION AND DISPOSITION ---------------------------------        IMPRESSION  No diagnosis found.    DISPOSITION  [facility]: [facility]  Patient condition is critical    CRITICAL CARE TIME     CRITICAL CARE :  The high probability of clinically significant deterioration in the patient’s condition required my highest level of medical decision making and my highest level of preparedness to intervene emergently.   I provided minutes 40 of critical care, not including other billable services/procedures.    The patient was reevaluated/re-examined multiple times during the visit. Critical care time includes management at bedside, discussion with other providers and consultants, family counseling and answering questions, and documentation. Care involves decision making of high complexity to assess, manipulate, and support vital organ system failure and/or to prevent further life threatening deterioration of the patient's condition. Failure to initiate these interventions on an urgent basis would likely result in sudden, clinically significant or life threatening deterioration in the patient's condition of acute stroke       Constantine Magaña MD  07/17/24 5532

## 2024-07-16 NOTE — ED TRIAGE NOTES
Pt last known well at 1700 per wife. Stroke alert called. Dr Magaña met [place]cher [place] upon [place], and pt taken directly to CT.

## 2024-07-17 ENCOUNTER — TELEPHONE (OUTPATIENT)
Dept: SURGERY | Facility: HOSPITAL | Age: 79
End: 2024-07-17

## 2024-07-17 ENCOUNTER — APPOINTMENT (OUTPATIENT)
Dept: CARDIOLOGY | Facility: HOSPITAL | Age: 79
End: 2024-07-17
Payer: MEDICARE

## 2024-07-17 ENCOUNTER — APPOINTMENT (OUTPATIENT)
Dept: RADIOLOGY | Facility: HOSPITAL | Age: 79
End: 2024-07-17
Payer: MEDICARE

## 2024-07-17 ENCOUNTER — HOSPITAL ENCOUNTER (INPATIENT)
Facility: HOSPITAL | Age: 79
LOS: 3 days | Discharge: HOSPICE | End: 2024-07-20
Attending: PSYCHIATRY & NEUROLOGY | Admitting: PSYCHIATRY & NEUROLOGY
Payer: MEDICARE

## 2024-07-17 VITALS
HEIGHT: 68 IN | BODY MASS INDEX: 23.52 KG/M2 | WEIGHT: 155.2 LBS | TEMPERATURE: 97.4 F | HEART RATE: 93 BPM | RESPIRATION RATE: 20 BRPM | SYSTOLIC BLOOD PRESSURE: 119 MMHG | DIASTOLIC BLOOD PRESSURE: 71 MMHG | OXYGEN SATURATION: 99 %

## 2024-07-17 DIAGNOSIS — I63.512 CEREBROVASCULAR ACCIDENT (CVA) DUE TO OCCLUSION OF LEFT MIDDLE CEREBRAL ARTERY (MULTI): ICD-10-CM

## 2024-07-17 DIAGNOSIS — I61.9 HEMORRHAGIC STROKE (MULTI): Primary | ICD-10-CM

## 2024-07-17 PROBLEM — I63.412 CEREBROVASCULAR ACCIDENT (CVA) DUE TO EMBOLISM OF LEFT MIDDLE CEREBRAL ARTERY (MULTI): Status: ACTIVE | Noted: 2024-01-01

## 2024-07-17 PROBLEM — Z92.82 RECEIVED TISSUE PLASMINOGEN ACTIVATOR (T-PA) LESS THAN 24 HOURS PRIOR TO ARRIVAL: Status: ACTIVE | Noted: 2024-01-01

## 2024-07-17 LAB
ABO GROUP (TYPE) IN BLOOD: NORMAL
ALBUMIN SERPL BCP-MCNC: 3.6 G/DL (ref 3.4–5)
ALP SERPL-CCNC: 68 U/L (ref 33–136)
ALT SERPL W P-5'-P-CCNC: 18 U/L (ref 10–52)
ANION GAP SERPL CALC-SCNC: 15 MMOL/L (ref 10–20)
ANTIBODY SCREEN: NORMAL
ANTIBODY SCREEN: NORMAL
AORTIC VALVE PEAK VELOCITY: 1.24 M/S
APTT PPP: 25 SECONDS (ref 27–38)
AST SERPL W P-5'-P-CCNC: 18 U/L (ref 9–39)
ATRIAL RATE: 82 BPM
AV PEAK GRADIENT: 6.2 MMHG
AVA (PEAK VEL): 2.61 CM2
BASOPHILS # BLD AUTO: 0.03 X10*3/UL (ref 0–0.1)
BASOPHILS NFR BLD AUTO: 0.1 %
BILIRUB SERPL-MCNC: 0.5 MG/DL (ref 0–1.2)
BNP SERPL-MCNC: 150 PG/ML (ref 0–99)
BUN SERPL-MCNC: 26 MG/DL (ref 6–23)
CA-I BLD-SCNC: 1.11 MMOL/L (ref 1.1–1.33)
CALCIUM SERPL-MCNC: 8.6 MG/DL (ref 8.6–10.6)
CHLORIDE SERPL-SCNC: 105 MMOL/L (ref 98–107)
CHOLEST SERPL-MCNC: 115 MG/DL (ref 0–199)
CHOLESTEROL/HDL RATIO: 4.4
CO2 SERPL-SCNC: 22 MMOL/L (ref 21–32)
CREAT SERPL-MCNC: 1.03 MG/DL (ref 0.5–1.3)
EGFRCR SERPLBLD CKD-EPI 2021: 74 ML/MIN/1.73M*2
EJECTION FRACTION: 65 %
EOSINOPHIL # BLD AUTO: 0 X10*3/UL (ref 0–0.4)
EOSINOPHIL NFR BLD AUTO: 0 %
ERYTHROCYTE [DISTWIDTH] IN BLOOD BY AUTOMATED COUNT: 14.1 % (ref 11.5–14.5)
ERYTHROCYTE [DISTWIDTH] IN BLOOD BY AUTOMATED COUNT: 14.2 % (ref 11.5–14.5)
EST. AVERAGE GLUCOSE BLD GHB EST-MCNC: 111 MG/DL
FIBRINOGEN PPP-MCNC: 563 MG/DL (ref 200–400)
GLUCOSE BLD MANUAL STRIP-MCNC: 146 MG/DL (ref 74–99)
GLUCOSE BLD MANUAL STRIP-MCNC: 146 MG/DL (ref 74–99)
GLUCOSE BLD MANUAL STRIP-MCNC: 162 MG/DL (ref 74–99)
GLUCOSE BLD MANUAL STRIP-MCNC: 184 MG/DL (ref 74–99)
GLUCOSE SERPL-MCNC: 182 MG/DL (ref 74–99)
HBA1C MFR BLD: 5.5 %
HCT VFR BLD AUTO: 32.8 % (ref 41–52)
HCT VFR BLD AUTO: 38.8 % (ref 41–52)
HDLC SERPL-MCNC: 25.9 MG/DL
HGB BLD-MCNC: 11.1 G/DL (ref 13.5–17.5)
HGB BLD-MCNC: 12.4 G/DL (ref 13.5–17.5)
HOLD SPECIMEN: NORMAL
IMM GRANULOCYTES # BLD AUTO: 0.15 X10*3/UL (ref 0–0.5)
IMM GRANULOCYTES NFR BLD AUTO: 0.7 % (ref 0–0.9)
INR PPP: 1.3 (ref 0.9–1.1)
INR PPP: 1.4 (ref 0.9–1.1)
LDLC SERPL CALC-MCNC: 73 MG/DL
LEFT VENTRICULAR OUTFLOW TRACT DIAMETER: 2 CM
LYMPHOCYTES # BLD AUTO: 0.88 X10*3/UL (ref 0.8–3)
LYMPHOCYTES NFR BLD AUTO: 4.1 %
MAGNESIUM SERPL-MCNC: 1.94 MG/DL (ref 1.6–2.4)
MCH RBC QN AUTO: 27.9 PG (ref 26–34)
MCH RBC QN AUTO: 28.1 PG (ref 26–34)
MCHC RBC AUTO-ENTMCNC: 32 G/DL (ref 32–36)
MCHC RBC AUTO-ENTMCNC: 33.8 G/DL (ref 32–36)
MCV RBC AUTO: 82 FL (ref 80–100)
MCV RBC AUTO: 88 FL (ref 80–100)
MITRAL VALVE E/A RATIO: 0.77
MONOCYTES # BLD AUTO: 1.35 X10*3/UL (ref 0.05–0.8)
MONOCYTES NFR BLD AUTO: 6.3 %
NEUTROPHILS # BLD AUTO: 19.13 X10*3/UL (ref 1.6–5.5)
NEUTROPHILS NFR BLD AUTO: 88.8 %
NON HDL CHOLESTEROL: 89 MG/DL (ref 0–149)
NRBC BLD-RTO: 0 /100 WBCS (ref 0–0)
NRBC BLD-RTO: 0 /100 WBCS (ref 0–0)
P AXIS: 39 DEGREES
P OFFSET: 200 MS
P ONSET: 148 MS
PHOSPHATE SERPL-MCNC: 3.6 MG/DL (ref 2.5–4.9)
PLATELET # BLD AUTO: 233 X10*3/UL (ref 150–450)
PLATELET # BLD AUTO: 238 X10*3/UL (ref 150–450)
POTASSIUM SERPL-SCNC: 3.7 MMOL/L (ref 3.5–5.3)
PR INTERVAL: 148 MS
PROT SERPL-MCNC: 7.1 G/DL (ref 6.4–8.2)
PROTHROMBIN TIME: 14.6 SECONDS (ref 9.8–12.8)
PROTHROMBIN TIME: 15.6 SECONDS (ref 9.8–12.8)
Q ONSET: 222 MS
QRS COUNT: 13 BEATS
QRS DURATION: 136 MS
QT INTERVAL: 398 MS
QTC CALCULATION(BAZETT): 464 MS
QTC FREDERICIA: 441 MS
R AXIS: -12 DEGREES
RBC # BLD AUTO: 3.98 X10*6/UL (ref 4.5–5.9)
RBC # BLD AUTO: 4.41 X10*6/UL (ref 4.5–5.9)
RH FACTOR (ANTIGEN D): NORMAL
RIGHT VENTRICLE FREE WALL PEAK S': 18.4 CM/S
SODIUM SERPL-SCNC: 138 MMOL/L (ref 136–145)
T AXIS: -5 DEGREES
T OFFSET: 421 MS
TRICUSPID ANNULAR PLANE SYSTOLIC EXCURSION: 2.5 CM
TRIGL SERPL-MCNC: 81 MG/DL (ref 0–149)
VENTRICULAR RATE: 82 BPM
VLDL: 16 MG/DL (ref 0–40)
WBC # BLD AUTO: 21.5 X10*3/UL (ref 4.4–11.3)
WBC # BLD AUTO: 24.9 X10*3/UL (ref 4.4–11.3)

## 2024-07-17 PROCEDURE — 71045 X-RAY EXAM CHEST 1 VIEW: CPT | Performed by: RADIOLOGY

## 2024-07-17 PROCEDURE — 36620 INSERTION CATHETER ARTERY: CPT

## 2024-07-17 PROCEDURE — 85610 PROTHROMBIN TIME: CPT | Performed by: EMERGENCY MEDICINE

## 2024-07-17 PROCEDURE — 86901 BLOOD TYPING SEROLOGIC RH(D): CPT | Performed by: STUDENT IN AN ORGANIZED HEALTH CARE EDUCATION/TRAINING PROGRAM

## 2024-07-17 PROCEDURE — 84100 ASSAY OF PHOSPHORUS: CPT

## 2024-07-17 PROCEDURE — 2500000004 HC RX 250 GENERAL PHARMACY W/ HCPCS (ALT 636 FOR OP/ED)

## 2024-07-17 PROCEDURE — 85025 COMPLETE CBC W/AUTO DIFF WBC: CPT

## 2024-07-17 PROCEDURE — 70450 CT HEAD/BRAIN W/O DYE: CPT | Performed by: STUDENT IN AN ORGANIZED HEALTH CARE EDUCATION/TRAINING PROGRAM

## 2024-07-17 PROCEDURE — 99291 CRITICAL CARE FIRST HOUR: CPT | Performed by: NEUROLOGICAL SURGERY

## 2024-07-17 PROCEDURE — 71045 X-RAY EXAM CHEST 1 VIEW: CPT | Mod: 52

## 2024-07-17 PROCEDURE — 70450 CT HEAD/BRAIN W/O DYE: CPT

## 2024-07-17 PROCEDURE — 80061 LIPID PANEL: CPT | Performed by: STUDENT IN AN ORGANIZED HEALTH CARE EDUCATION/TRAINING PROGRAM

## 2024-07-17 PROCEDURE — 85027 COMPLETE CBC AUTOMATED: CPT | Performed by: EMERGENCY MEDICINE

## 2024-07-17 PROCEDURE — 99223 1ST HOSP IP/OBS HIGH 75: CPT

## 2024-07-17 PROCEDURE — C9113 INJ PANTOPRAZOLE SODIUM, VIA: HCPCS

## 2024-07-17 PROCEDURE — 80053 COMPREHEN METABOLIC PANEL: CPT | Performed by: STUDENT IN AN ORGANIZED HEALTH CARE EDUCATION/TRAINING PROGRAM

## 2024-07-17 PROCEDURE — 86901 BLOOD TYPING SEROLOGIC RH(D): CPT | Performed by: EMERGENCY MEDICINE

## 2024-07-17 PROCEDURE — 96375 TX/PRO/DX INJ NEW DRUG ADDON: CPT | Mod: 59

## 2024-07-17 PROCEDURE — 96366 THER/PROPH/DIAG IV INF ADDON: CPT | Mod: 59

## 2024-07-17 PROCEDURE — 83735 ASSAY OF MAGNESIUM: CPT | Performed by: STUDENT IN AN ORGANIZED HEALTH CARE EDUCATION/TRAINING PROGRAM

## 2024-07-17 PROCEDURE — 82947 ASSAY GLUCOSE BLOOD QUANT: CPT

## 2024-07-17 PROCEDURE — 93306 TTE W/DOPPLER COMPLETE: CPT

## 2024-07-17 PROCEDURE — 93306 TTE W/DOPPLER COMPLETE: CPT | Performed by: INTERNAL MEDICINE

## 2024-07-17 PROCEDURE — 70450 CT HEAD/BRAIN W/O DYE: CPT | Performed by: RADIOLOGY

## 2024-07-17 PROCEDURE — 94002 VENT MGMT INPAT INIT DAY: CPT

## 2024-07-17 PROCEDURE — 85384 FIBRINOGEN ACTIVITY: CPT | Performed by: STUDENT IN AN ORGANIZED HEALTH CARE EDUCATION/TRAINING PROGRAM

## 2024-07-17 PROCEDURE — 83036 HEMOGLOBIN GLYCOSYLATED A1C: CPT | Performed by: STUDENT IN AN ORGANIZED HEALTH CARE EDUCATION/TRAINING PROGRAM

## 2024-07-17 PROCEDURE — 71045 X-RAY EXAM CHEST 1 VIEW: CPT | Mod: FOREIGN READ | Performed by: RADIOLOGY

## 2024-07-17 PROCEDURE — 31500 INSERT EMERGENCY AIRWAY: CPT | Performed by: EMERGENCY MEDICINE

## 2024-07-17 PROCEDURE — 5A1945Z RESPIRATORY VENTILATION, 24-96 CONSECUTIVE HOURS: ICD-10-PCS | Performed by: PSYCHIATRY & NEUROLOGY

## 2024-07-17 PROCEDURE — 2500000004 HC RX 250 GENERAL PHARMACY W/ HCPCS (ALT 636 FOR OP/ED): Performed by: EMERGENCY MEDICINE

## 2024-07-17 PROCEDURE — 71045 X-RAY EXAM CHEST 1 VIEW: CPT

## 2024-07-17 PROCEDURE — 2500000004 HC RX 250 GENERAL PHARMACY W/ HCPCS (ALT 636 FOR OP/ED): Performed by: STUDENT IN AN ORGANIZED HEALTH CARE EDUCATION/TRAINING PROGRAM

## 2024-07-17 PROCEDURE — P9012 CRYOPRECIPITATE EACH UNIT: HCPCS

## 2024-07-17 PROCEDURE — 96365 THER/PROPH/DIAG IV INF INIT: CPT | Mod: 59

## 2024-07-17 PROCEDURE — 82330 ASSAY OF CALCIUM: CPT

## 2024-07-17 PROCEDURE — 85610 PROTHROMBIN TIME: CPT | Performed by: STUDENT IN AN ORGANIZED HEALTH CARE EDUCATION/TRAINING PROGRAM

## 2024-07-17 PROCEDURE — 36430 TRANSFUSION BLD/BLD COMPNT: CPT | Mod: 59

## 2024-07-17 PROCEDURE — 36415 COLL VENOUS BLD VENIPUNCTURE: CPT | Performed by: EMERGENCY MEDICINE

## 2024-07-17 PROCEDURE — 2500000005 HC RX 250 GENERAL PHARMACY W/O HCPCS: Performed by: PSYCHIATRY & NEUROLOGY

## 2024-07-17 PROCEDURE — 2020000001 HC ICU ROOM DAILY

## 2024-07-17 PROCEDURE — 2500000002 HC RX 250 W HCPCS SELF ADMINISTERED DRUGS (ALT 637 FOR MEDICARE OP, ALT 636 FOR OP/ED): Performed by: STUDENT IN AN ORGANIZED HEALTH CARE EDUCATION/TRAINING PROGRAM

## 2024-07-17 PROCEDURE — 2500000001 HC RX 250 WO HCPCS SELF ADMINISTERED DRUGS (ALT 637 FOR MEDICARE OP): Performed by: STUDENT IN AN ORGANIZED HEALTH CARE EDUCATION/TRAINING PROGRAM

## 2024-07-17 PROCEDURE — 83880 ASSAY OF NATRIURETIC PEPTIDE: CPT | Performed by: STUDENT IN AN ORGANIZED HEALTH CARE EDUCATION/TRAINING PROGRAM

## 2024-07-17 RX ORDER — ACETAMINOPHEN 650 MG/1
650 SUPPOSITORY RECTAL EVERY 6 HOURS PRN
Status: DISCONTINUED | OUTPATIENT
Start: 2024-07-17 | End: 2024-07-20

## 2024-07-17 RX ORDER — AMOXICILLIN 250 MG
2 CAPSULE ORAL 2 TIMES DAILY
Status: DISCONTINUED | OUTPATIENT
Start: 2024-07-17 | End: 2024-07-17

## 2024-07-17 RX ORDER — ATORVASTATIN CALCIUM 40 MG/1
40 TABLET, FILM COATED ORAL NIGHTLY
Status: DISCONTINUED | OUTPATIENT
Start: 2024-07-17 | End: 2024-07-20

## 2024-07-17 RX ORDER — NICARDIPINE HYDROCHLORIDE 0.2 MG/ML
5 INJECTION INTRAVENOUS CONTINUOUS
Status: DISCONTINUED | OUTPATIENT
Start: 2024-07-17 | End: 2024-07-17 | Stop reason: HOSPADM

## 2024-07-17 RX ORDER — NICARDIPINE HYDROCHLORIDE 0.2 MG/ML
INJECTION INTRAVENOUS
Status: COMPLETED
Start: 2024-07-17 | End: 2024-07-17

## 2024-07-17 RX ORDER — LABETALOL HYDROCHLORIDE 5 MG/ML
10 INJECTION, SOLUTION INTRAVENOUS EVERY 10 MIN PRN
Status: ACTIVE | OUTPATIENT
Start: 2024-07-17 | End: 2024-07-19

## 2024-07-17 RX ORDER — HYDRALAZINE HYDROCHLORIDE 20 MG/ML
10 INJECTION INTRAMUSCULAR; INTRAVENOUS
Status: DISCONTINUED | OUTPATIENT
Start: 2024-07-17 | End: 2024-07-19

## 2024-07-17 RX ORDER — NICARDIPINE HYDROCHLORIDE 0.2 MG/ML
2.5-15 INJECTION INTRAVENOUS CONTINUOUS
Status: DISCONTINUED | OUTPATIENT
Start: 2024-07-17 | End: 2024-07-18

## 2024-07-17 RX ORDER — ACETAMINOPHEN 160 MG/5ML
650 SOLUTION ORAL EVERY 4 HOURS PRN
Status: DISCONTINUED | OUTPATIENT
Start: 2024-07-17 | End: 2024-07-20

## 2024-07-17 RX ORDER — LIDOCAINE HYDROCHLORIDE 20 MG/ML
1 JELLY TOPICAL ONCE
Status: DISCONTINUED | OUTPATIENT
Start: 2024-07-17 | End: 2024-07-20

## 2024-07-17 RX ORDER — PRAVASTATIN SODIUM 40 MG/1
40 TABLET ORAL DAILY
Status: DISCONTINUED | OUTPATIENT
Start: 2024-07-17 | End: 2024-07-17

## 2024-07-17 RX ORDER — ACETAMINOPHEN 650 MG/1
650 SUPPOSITORY RECTAL EVERY 4 HOURS PRN
Status: DISCONTINUED | OUTPATIENT
Start: 2024-07-17 | End: 2024-07-20

## 2024-07-17 RX ORDER — METOCLOPRAMIDE HYDROCHLORIDE 5 MG/ML
10 INJECTION INTRAMUSCULAR; INTRAVENOUS ONCE
Status: COMPLETED | OUTPATIENT
Start: 2024-07-17 | End: 2024-07-17

## 2024-07-17 RX ORDER — ATORVASTATIN CALCIUM 40 MG/1
40 TABLET, FILM COATED ORAL NIGHTLY
Status: DISCONTINUED | OUTPATIENT
Start: 2024-07-17 | End: 2024-07-17

## 2024-07-17 RX ORDER — GABAPENTIN 300 MG/1
300 CAPSULE ORAL EVERY MORNING
Status: DISCONTINUED | OUTPATIENT
Start: 2024-07-17 | End: 2024-07-20

## 2024-07-17 RX ORDER — PANTOPRAZOLE SODIUM 40 MG/10ML
40 INJECTION, POWDER, LYOPHILIZED, FOR SOLUTION INTRAVENOUS DAILY
Status: DISCONTINUED | OUTPATIENT
Start: 2024-07-17 | End: 2024-07-17

## 2024-07-17 RX ORDER — INSULIN LISPRO 100 [IU]/ML
0-5 INJECTION, SOLUTION INTRAVENOUS; SUBCUTANEOUS EVERY 4 HOURS
Status: DISCONTINUED | OUTPATIENT
Start: 2024-07-17 | End: 2024-07-18

## 2024-07-17 RX ORDER — AMOXICILLIN 250 MG
2 CAPSULE ORAL 2 TIMES DAILY
Status: DISCONTINUED | OUTPATIENT
Start: 2024-07-17 | End: 2024-07-20

## 2024-07-17 RX ORDER — HYDROMORPHONE HYDROCHLORIDE 1 MG/ML
0.2 INJECTION, SOLUTION INTRAMUSCULAR; INTRAVENOUS; SUBCUTANEOUS
Status: DISCONTINUED | OUTPATIENT
Start: 2024-07-17 | End: 2024-07-20

## 2024-07-17 RX ORDER — OXYMETAZOLINE HCL 0.05 %
2 SPRAY, NON-AEROSOL (ML) NASAL ONCE
Status: DISPENSED | OUTPATIENT
Start: 2024-07-17 | End: 2024-07-20

## 2024-07-17 RX ORDER — SODIUM CHLORIDE 9 MG/ML
100 INJECTION, SOLUTION INTRAVENOUS CONTINUOUS
Status: DISCONTINUED | OUTPATIENT
Start: 2024-07-17 | End: 2024-07-19

## 2024-07-17 RX ORDER — ESOMEPRAZOLE MAGNESIUM 40 MG/1
10 GRANULE, DELAYED RELEASE ORAL
Status: DISCONTINUED | OUTPATIENT
Start: 2024-07-18 | End: 2024-07-19

## 2024-07-17 RX ORDER — FINASTERIDE 5 MG/1
5 TABLET, FILM COATED ORAL DAILY
Status: DISCONTINUED | OUTPATIENT
Start: 2024-07-17 | End: 2024-07-20

## 2024-07-17 RX ORDER — ACETAMINOPHEN 325 MG/1
650 TABLET ORAL EVERY 4 HOURS PRN
Status: DISCONTINUED | OUTPATIENT
Start: 2024-07-17 | End: 2024-07-20

## 2024-07-17 RX ORDER — DEXTROSE 50 % IN WATER (D50W) INTRAVENOUS SYRINGE
25
Status: DISCONTINUED | OUTPATIENT
Start: 2024-07-17 | End: 2024-07-20

## 2024-07-17 RX ORDER — GABAPENTIN 300 MG/1
600 CAPSULE ORAL NIGHTLY
Status: DISCONTINUED | OUTPATIENT
Start: 2024-07-17 | End: 2024-07-20

## 2024-07-17 RX ORDER — HYDRALAZINE HYDROCHLORIDE 50 MG/1
25 TABLET, FILM COATED ORAL EVERY 6 HOURS PRN
Status: DISCONTINUED | OUTPATIENT
Start: 2024-07-19 | End: 2024-07-20

## 2024-07-17 RX ORDER — DEXTROSE 50 % IN WATER (D50W) INTRAVENOUS SYRINGE
12.5
Status: DISCONTINUED | OUTPATIENT
Start: 2024-07-17 | End: 2024-07-20

## 2024-07-17 SDOH — SOCIAL STABILITY: SOCIAL INSECURITY: ARE THERE ANY APPARENT SIGNS OF INJURIES/BEHAVIORS THAT COULD BE RELATED TO ABUSE/NEGLECT?: UNABLE TO ASSESS

## 2024-07-17 SDOH — SOCIAL STABILITY: SOCIAL INSECURITY: HAS ANYONE EVER THREATENED TO HURT YOUR FAMILY OR YOUR PETS?: UNABLE TO ASSESS

## 2024-07-17 SDOH — SOCIAL STABILITY: SOCIAL INSECURITY: HAVE YOU HAD THOUGHTS OF HARMING ANYONE ELSE?: UNABLE TO ASSESS

## 2024-07-17 SDOH — SOCIAL STABILITY: SOCIAL INSECURITY: DO YOU FEEL ANYONE HAS EXPLOITED OR TAKEN ADVANTAGE OF YOU FINANCIALLY OR OF YOUR PERSONAL PROPERTY?: UNABLE TO ASSESS

## 2024-07-17 SDOH — SOCIAL STABILITY: SOCIAL INSECURITY: ARE YOU OR HAVE YOU BEEN THREATENED OR ABUSED PHYSICALLY, EMOTIONALLY, OR SEXUALLY BY ANYONE?: UNABLE TO ASSESS

## 2024-07-17 SDOH — SOCIAL STABILITY: SOCIAL INSECURITY: WERE YOU ABLE TO COMPLETE ALL THE BEHAVIORAL HEALTH SCREENINGS?: YES

## 2024-07-17 SDOH — SOCIAL STABILITY: SOCIAL INSECURITY: DOES ANYONE TRY TO KEEP YOU FROM HAVING/CONTACTING OTHER FRIENDS OR DOING THINGS OUTSIDE YOUR HOME?: UNABLE TO ASSESS

## 2024-07-17 SDOH — SOCIAL STABILITY: SOCIAL INSECURITY: ABUSE: ADULT

## 2024-07-17 SDOH — SOCIAL STABILITY: SOCIAL INSECURITY: HAVE YOU HAD ANY THOUGHTS OF HARMING ANYONE ELSE?: UNABLE TO ASSESS

## 2024-07-17 SDOH — SOCIAL STABILITY: SOCIAL INSECURITY: DO YOU FEEL UNSAFE GOING BACK TO THE PLACE WHERE YOU ARE LIVING?: UNABLE TO ASSESS

## 2024-07-17 ASSESSMENT — PAIN - FUNCTIONAL ASSESSMENT
PAIN_FUNCTIONAL_ASSESSMENT: CPOT (CRITICAL CARE PAIN OBSERVATION TOOL)
PAIN_FUNCTIONAL_ASSESSMENT: CPOT (CRITICAL CARE PAIN OBSERVATION TOOL)

## 2024-07-17 ASSESSMENT — LIFESTYLE VARIABLES
AUDIT-C TOTAL SCORE: -1
HOW OFTEN DO YOU HAVE A DRINK CONTAINING ALCOHOL: PATIENT UNABLE TO ANSWER
AUDIT-C TOTAL SCORE: -1
HOW MANY STANDARD DRINKS CONTAINING ALCOHOL DO YOU HAVE ON A TYPICAL DAY: PATIENT UNABLE TO ANSWER
HOW OFTEN DO YOU HAVE 6 OR MORE DRINKS ON ONE OCCASION: PATIENT UNABLE TO ANSWER
SKIP TO QUESTIONS 9-10: 0

## 2024-07-17 ASSESSMENT — ACTIVITIES OF DAILY LIVING (ADL)
HEARING - LEFT EAR: UNABLE TO ASSESS
FEEDING YOURSELF: UNABLE TO ASSESS
JUDGMENT_ADEQUATE_SAFELY_COMPLETE_DAILY_ACTIVITIES: UNABLE TO ASSESS
ADEQUATE_TO_COMPLETE_ADL: UNABLE TO ASSESS
LACK_OF_TRANSPORTATION: PATIENT UNABLE TO ANSWER
PATIENT'S MEMORY ADEQUATE TO SAFELY COMPLETE DAILY ACTIVITIES?: UNABLE TO ASSESS
HEARING - RIGHT EAR: UNABLE TO ASSESS
GROOMING: UNABLE TO ASSESS
TOILETING: UNABLE TO ASSESS
WALKS IN HOME: UNABLE TO ASSESS
DRESSING YOURSELF: UNABLE TO ASSESS
BATHING: UNABLE TO ASSESS

## 2024-07-17 ASSESSMENT — COLUMBIA-SUICIDE SEVERITY RATING SCALE - C-SSRS
6. HAVE YOU EVER DONE ANYTHING, STARTED TO DO ANYTHING, OR PREPARED TO DO ANYTHING TO END YOUR LIFE?: NO
5. HAVE YOU STARTED TO WORK OUT OR WORKED OUT THE DETAILS OF HOW TO KILL YOURSELF? DO YOU INTEND TO CARRY OUT THIS PLAN?: NO
2. HAVE YOU ACTUALLY HAD ANY THOUGHTS OF KILLING YOURSELF?: NO
4. HAVE YOU HAD THESE THOUGHTS AND HAD SOME INTENTION OF ACTING ON THEM?: NO
1. IN THE PAST MONTH, HAVE YOU WISHED YOU WERE DEAD OR WISHED YOU COULD GO TO SLEEP AND NOT WAKE UP?: NO

## 2024-07-17 ASSESSMENT — PATIENT HEALTH QUESTIONNAIRE - PHQ9
SUM OF ALL RESPONSES TO PHQ9 QUESTIONS 1 & 2: 0
2. FEELING DOWN, DEPRESSED OR HOPELESS: NOT AT ALL
1. LITTLE INTEREST OR PLEASURE IN DOING THINGS: NOT AT ALL

## 2024-07-17 ASSESSMENT — COGNITIVE AND FUNCTIONAL STATUS - GENERAL: PATIENT BASELINE BEDBOUND: UNABLE TO ASSESS AT THIS TIME

## 2024-07-17 NOTE — NURSING NOTE
Prevention Rounds    Michael score: 11    Prevention interventions currently in place: [facility] bed, turning wedges, incontinence management    Prevention interventions recommended: Ensure bed remains plugged in at all times, inflate EHOB waffle mattress, reduce layers of linens, when using wedges make sure there is a gap between to float sacrum, Mepilex foams to sacrum & heels, float heels    Plan discussed with bedside nurse.    Tanesha Vázquez RN, CWON  Wound/Ostomy Nurse

## 2024-07-17 NOTE — ED PROCEDURE NOTE
Procedure  Critical Care    Performed by: Natalia Powell MD  Authorized by: Constantine Magaña MD    Critical care provider statement:     Critical care time (minutes):  37    Critical care start time:  7/15/2024 9:13 PM    Critical care time was exclusive of:  Separately billable procedures and treating other patients    Critical care was necessary to treat or prevent imminent or life-threatening deterioration of the following conditions: STROKE.    Critical care was time spent personally by me on the following activities:  Development of treatment plan with patient or surrogate, evaluation of patient's response to treatment, examination of patient, obtaining history from patient or surrogate, ordering and performing treatments and interventions, ordering and review of laboratory studies, ordering and review of radiographic studies, pulse oximetry and re-evaluation of patient's condition               Natalia Powell MD  07/16/24 9861

## 2024-07-17 NOTE — PROGRESS NOTES
"Diego Dumont is a 79 y.o. male on day 0 of admission presenting with cute left MCA a stroke which was treated with tenecteplase in the emergency department by Dr. Magaña.  I was notified by nursing staff that patient is vomiting again and now has a left gaze preference which is new from previous exam.    Given the alterations, concern for the possibility of post tenecteplase bleeding and CT imaging ordered.    Subjective   Family notes that he has been having stomach upset issues recently, with some diarrhea but not really vomiting.       Objective     Physical Exam  Vitals reviewed.   Constitutional:       Appearance: He is normal weight.   HENT:      Head: Normocephalic and atraumatic.   Eyes:      Pupils: Pupils are equal, round, and reactive to light.      Comments: Left gaze preference   Cardiovascular:      Rate and Rhythm: Normal rate and regular rhythm.   Neurological:      Comments: Persistent right facial paralysis, new left gaze preference, appropriate withdraws to pain.  Remains completely aphasic.  Right-sided weakness remains.       Last Recorded Vitals  Blood pressure (!) 179/92, pulse 60, temperature 36.6 °C (97.8 °F), temperature source Temporal, resp. rate 18, height 1.727 m (5' 8\"), weight 70.4 kg (155 lb 3.3 oz), SpO2 95%.  Intake/Output last 3 Shifts:  No intake/output data recorded.    Relevant Results              Labs Reviewed   CBC WITH AUTO DIFFERENTIAL - Abnormal       Result Value    WBC 20.0 (*)     nRBC 0.0      RBC 4.62      Hemoglobin 12.8 (*)     Hematocrit 40.5 (*)     MCV 88      MCH 27.7      MCHC 31.6 (*)     RDW 14.3      Platelets 256      Neutrophils % 81.8      Immature Granulocytes %, Automated 0.4      Lymphocytes % 10.8      Monocytes % 6.2      Eosinophils % 0.5      Basophils % 0.3      Neutrophils Absolute 16.37 (*)     Immature Granulocytes Absolute, Automated 0.07      Lymphocytes Absolute 2.16      Monocytes Absolute 1.23 (*)     Eosinophils Absolute 0.09      " Basophils Absolute 0.05     COMPREHENSIVE METABOLIC PANEL - Abnormal    Glucose 110 (*)     Sodium 137      Potassium 3.8      Chloride 105      Bicarbonate 25      Anion Gap 11      Urea Nitrogen 30 (*)     Creatinine 1.13      eGFR 67      Calcium 9.2      Albumin 4.1      Alkaline Phosphatase 73      Total Protein 8.0      AST 20      Bilirubin, Total 0.4      ALT 22     PROTIME-INR - Abnormal    Protime 14.9 (*)     INR 1.3 (*)    URINALYSIS WITH REFLEX MICROSCOPIC - Abnormal    Color, Urine Yellow      Appearance, Urine Hazy (*)     Specific Gravity, Urine 1.020      pH, Urine 5.0      Protein, Urine NEGATIVE      Glucose, Urine NEGATIVE      Blood, Urine LARGE (3+) (*)     Ketones, Urine NEGATIVE      Bilirubin, Urine NEGATIVE      Urobilinogen, Urine <2.0      Nitrite, Urine NEGATIVE      Leukocyte Esterase, Urine LARGE (3+) (*)    MICROSCOPIC ONLY, URINE - Abnormal    WBC, Urine 11-20 (*)     RBC, Urine >20 (*)     Bacteria, Urine 1+ (*)     Mucus, Urine FEW     COAGULATION SCREEN - Abnormal    Protime 15.6 (*)     INR 1.4 (*)     aPTT 25 (*)     Narrative:     The APTT is no longer used for monitoring Unfractionated Heparin Therapy. For monitoring Heparin Therapy, use the Heparin Assay.   CBC - Abnormal    WBC 24.9 (*)     nRBC 0.0      RBC 4.41 (*)     Hemoglobin 12.4 (*)     Hematocrit 38.8 (*)     MCV 88      MCH 28.1      MCHC 32.0      RDW 14.2      Platelets 238     POCT GLUCOSE - Abnormal    POCT Glucose 101 (*)    SERIAL TROPONIN-INITIAL - Normal    Troponin I, High Sensitivity 8      Narrative:     Less than 99th percentile of normal range cutoff-  Female and children under 18 years old <14 ng/L; Male <21 ng/L: Negative  Repeat testing should be performed if clinically indicated.     Female and children under 18 years old 14-50 ng/L; Male 21-50 ng/L:  Consistent with possible cardiac damage and possible increased clinical   risk. Serial measurements may help to assess extent of myocardial damage.      >50 ng/L: Consistent with cardiac damage, increased clinical risk and  myocardial infarction. Serial measurements may help assess extent of   myocardial damage.      NOTE: Children less than 1 year old may have higher baseline troponin   levels and results should be interpreted in conjunction with the overall   clinical context.     NOTE: Troponin I testing is performed using a different   testing methodology at [facility] than at other   [facility]. Direct result comparisons should only   be made within the same method.   SERIAL TROPONIN, 1 HOUR - Normal    Troponin I, High Sensitivity 8      Narrative:     Less than 99th percentile of normal range cutoff-  Female and children under 18 years old <14 ng/L; Male <21 ng/L: Negative  Repeat testing should be performed if clinically indicated.     Female and children under 18 years old 14-50 ng/L; Male 21-50 ng/L:  Consistent with possible cardiac damage and possible increased clinical   risk. Serial measurements may help to assess extent of myocardial damage.     >50 ng/L: Consistent with cardiac damage, increased clinical risk and  myocardial infarction. Serial measurements may help assess extent of   myocardial damage.      NOTE: Children less than 1 year old may have higher baseline troponin   levels and results should be interpreted in conjunction with the overall   clinical context.     NOTE: Troponin I testing is performed using a different   testing methodology at [facility] than at other   [facility]. Direct result comparisons should only   be made within the same method.   GRAY TOP    Extra Tube Hold for add-ons.     TROPONIN SERIES- (INITIAL, 1 HR)    Narrative:     The following orders were created for panel order Troponin Series, (0, 1 HR).  Procedure                               Abnormality         Status                     ---------                               -----------         ------                      Troponin I, High Sensiti...[[id number]]  Normal              Final result               Troponin, High Sensitivi...[[id number]]  Normal              Final result                 Please view results for these tests on the individual orders.   TYPE AND SCREEN    ABO TYPE B      Rh TYPE POS      ANTIBODY SCREEN NEG     VERAB/VERIFY ABORH    ABO TYPE B      Rh TYPE POS     FIBRINOGEN   GREEN TOP   ABORH   TYPE AND SCREEN   PREPARE CRYOPRECIPITATE POOLS    PRODUCT CODE G0254H26      Unit Number [id number]      Unit ABO O      Unit RH POS      Dispense Status IS      Blood Expiration Date 5/8/2025 11:59:00 PM EDT      PRODUCT BLOOD TYPE 5100      UNIT VOLUME 82      PRODUCT CODE X8279E15      Unit Number [id number]      Unit ABO O      Unit RH POS      Dispense Status IS      Blood Expiration Date 5/8/2025 11:59:00 PM EDT      PRODUCT BLOOD TYPE 5100      UNIT VOLUME 77       XR chest 1 view   Final Result   Collapse and consolidation of the right upper lobe which may represent   right upper lobe pneumonia however underlying central obstructing mass   is not excluded.  Further evaluation is recommended with   contrast-enhanced chest CT.   Signed by Jovon Walton      XR chest 1 view   Final Result   Endotracheal tube tip is 9 cm above the dina and can be advanced 5   to 6 cm.   Collapse and consolidation of the right upper lobe with rightward   shift of mediastinum and elevated right hemidiaphragm indicate right   upper lobe pneumonia however central obstructing mass is not excluded.    Contrast-enhanced chest CT is recommended.   Signed by Jovon Walton      XR chest 1 view   Final Result   Partial collapse and consolidation of the right upper lobe with an   elevated right hemidiaphragm.  Central obstructing mass is not   excluded, consider further evaluation with contrast-enhanced chest CT.   Signed by Jovon Walton      CT head wo IV contrast   Final Result   New large acute intraparenchymal hematoma centered  in the left   frontal lobe measuring at least 4 x 5.8 x 4.2 cm, with diffuse large   volume subarachnoid hemorrhage in the bilateral cerebral hemispheres,   cerebellum and basilar cisterns. Mild-to-moderate volume   intraventricular hemorrhage.        Diffuse edema involving the left cerebral hemisphere with crowding of   the basal cisterns, concerning for transtentorial/uncal herniation   and crowding of the foramen magnum concerning for tonsillar   herniation.             MACRO:   Teresa Seals discussed the significance and urgency of this critical   finding by telephone with  CHRISTIE ROBERTSON on 7/17/2024 at 1:10 am.   (**-RCF-**) Findings:  See findings.        Signed by: Teresa Seals 7/17/2024 1:16 AM   Dictation workstation:   [id number]      CT brain attack angio head and neck W and WO IV contrast   Final Result   1. No intracranial large vessel arterial branch occlusion or   hemodynamically significant stenosis.        2. No significant stenosis of the cervical carotid or vertebral   arteries.        3. Stable findings of acute left MCA territory infarct involving the   left frontal lobe and insular cortex.        MACRO:   None        Signed by: Teresa Seals 7/16/2024 8:06 PM   Dictation workstation:   [id number]      CT brain attack head wo IV contrast   Final Result   Evidence of acute left MCA territory infarct involving the insular   cortex and frontal operculum. Hyperdense sign involving an M2 branch   in the left sylvian fissure, suspicious for thrombosis. CT   angiography is recommended to assess for large vessel occlusion.        No acute intracranial hemorrhage.             MACRO:   Teresa Seals discussed the significance and urgency of this critical   finding by telephone with  FELISHA HAWK on 7/16/2024 at 7:01 pm.   (**-RCF-**) Findings:  See findings.        Signed by: Teresa Seals 7/16/2024 7:06 PM   Dictation workstation:   [id number]            ED Medication Administration from  07/16/2024 1817 to 07/17/2024 0216         Date/Time Order Dose Route Action Action by     07/16/2024 1850 EDT tenecteplase (TNKASE) injection for STROKE 18 mg 18 mg intravenous Given [facility], S     07/16/2024 1908 EDT dextrose 5%-0.45 % sodium chloride infusion 75 mL/hr intravenous New Bag [facility], S     07/16/2024 1928 EDT iohexol (OMNIPaque) 350 mg iodine/mL solution 75 mL 75 mL intravenous Given Ferl, T     07/16/2024 2023 EDT ondansetron (Zofran) injection 4 mg 4 mg intravenous Given Perez, S     07/16/2024 2026 EDT metoprolol tartrate (Lopressor) injection 5 mg 5 mg intravenous Given Perez, S     07/16/2024 2130 EDT metoprolol tartrate (Lopressor) injection 5 mg 5 mg intravenous Given [facility], S     07/16/2024 2154 EDT metoprolol tartrate (Lopressor) injection 5 mg 5 mg intravenous Given [facility], S     07/16/2024 2305 EDT ondansetron (Zofran) injection 4 mg 4 mg intravenous Given [facility], S     07/16/2024 2320 EDT oxygen (O2) therapy 2 L/min inhalation Start [facility], S     07/16/2024 2328 EDT metoprolol tartrate (Lopressor) injection 5 mg 5 mg intravenous Given [facility], S     07/17/2024 0010 EDT niCARdipine (Cardene) 40 mg in sodium chloride 200 mL (0.2 mg/mL) infusion (premix) 5 mg/hr intravenous New Bag Perez, S     07/17/2024 0011 EDT metoclopramide (Reglan) injection 10 mg 10 mg intravenous Given Perez, S     07/17/2024 0213 EDT dextrose 5%-0.45 % sodium chloride infusion 0 mL/hr intravenous Stopped Perez S     07/17/2024 0213 EDT niCARdipine (Cardene) 40 mg in sodium chloride 200 mL (0.2 mg/mL) infusion (premix) 0 mg/hr intravenous Stopped Perez S                     Assessment/Plan   Active Problems:    Cerebrovascular accident (CVA) due to occlusion of left middle cerebral artery (Multi)    Due to deterioration in the patient's neurologic status and the onset of vomiting CT imaging was urgently repeated and demonstrates hemorrhagic transformation of the previous infarcted area according to radiology report with additional  subarachnoid hemorrhage and mass effect with changes concerning for tonsillar herniation.    Stroke neurology and neurosurgery at [facility] were urgently reconsulted.  Patient's transfer to [facility] canceled and patient will instead go to [facility].  Aeromedical transport is requested.  Patient was intubated for airway protection.  Nicardipine initiated to allow for more precise blood pressure control.  Cryoprecipitate ordered to reverse effects of tenecteplase.    Patient's wife and son were present throughout all of the medical decision making.  They understand the patient's very critical nature and that his prognosis is very poor.       I spent 95 minutes in the professional and overall care of this patient.      Natalia Powell MD

## 2024-07-17 NOTE — PROGRESS NOTES
[facility]  NEUROSCIENCE INTENSIVE CARE UNIT  DAILY PROGRESS NOTE       Patient Name: Diego Dumont   MRN: [id number]     Admit Date: 2024     : 1945 AGE: 79 y.o. GENDER: male      Subjective    79 year-old male with past medical history of hypertension, hyperlipidemia, CAD, OA, and BPH who presented as OSH transfer for management of hemorrhagic transformation of left MCA infarct. Patient presented to OSH ED as stroke code, with new onset aphasia. LKW  1700. Initial NIHSS 11. CTH demonstrated left MCA infarct involving insular cortex and frontal operculum. TNK given at  1850. Neurologic exam changed around midnight with new left gaze preference and vomiting. NIHSS 26. CTH demonstrated new hematoma in left frontal lobe and diffuse SAH in bilateral cerebral hemispheres, cerebellum, and basilar cisterns with mild to moderate IVH. Patient was intubated, given 10u of cryoprecipitate, and started on nicardipine infusion at OSH prior to transfer to [facility].    Interval Events: Admitted to NSU.     Objective   VITALS:  Temp:  [36.3 °C (97.4 °F)-36.6 °C (97.8 °F)] 36.3 °C (97.4 °F)  Heart Rate:  [] 85  Resp:  [10-35] 19  BP: (119-192)/() 119/71  FiO2 (%):  [28 %-100 %] 100 %  INTAKE/OUTPUT:No intake or output data in the 24 hours ending 24 0403  VENT SETTINGS:  Vent Mode: Volume control/assist control  FiO2 (%):  [28 %-100 %] 100 %  S RR:  [16] 16  S VT:  [450 mL] 450 mL  PEEP/CPAP (cm H2O):  [5 cm H20] 5 cm H20  MAP (cm H2O):  [9] 9     PHYSICAL EXAM:  NEURO:  - ECNS, midline gaze, 2mm/reactive bilaterally  - Left corneal present, right corneal absent, positive gag, unable to assess cough (trouble passing ET suction), appeared to be coughing while laying flat  - BLE flicker withdraw to nox stim, RUE flaccid, LUE spontaneous  CV:  - RRR on telemetry, NSR-ST  - Left radial arterial line in place  RESP:  - Regular, unlabored  - Oxygen: ventilator  :  - Indwelling  catheter in place  GI:  - Abdomen NT/ND, soft  SKIN:  - Intact    MEDICATIONS:  Scheduled: PRN: Continuous:   atorvastatin, 40 mg, nasogastric tube, Nightly  finasteride, 5 mg, oral, Daily  gabapentin, 300 mg, nasogastric tube, q AM  gabapentin, 600 mg, nasogastric tube, Nightly  insulin lispro, 0-5 Units, subcutaneous, q4h  oxygen, , inhalation, Continuous - Inhalation  perflutren lipid microspheres, 0.5-10 mL of dilution, intravenous, Once in imaging  perflutren protein A microsphere, 0.5 mL, intravenous, Once in imaging  pravastatin, 40 mg, nasogastric tube, Daily  sennosides-docusate sodium, 2 tablet, nasogastric tube, BID  sennosides-docusate sodium, 2 tablet, oral, BID  sulfur hexafluoride microsphr, 2 mL, intravenous, Once in imaging     PRN medications: acetaminophen **OR** acetaminophen **OR** acetaminophen, dextrose, dextrose, glucagon, glucagon, hydrALAZINE **FOLLOWED BY** [START ON 7/19/2024] hydrALAZINE, labetaloL, oxygen niCARdipine, 2.5-15 mg/hr  sodium chloride 0.9%, 100 mL/hr         LAB RESULTS:  Results from last 72 hours   Lab Units 07/16/24  1905   GLUCOSE mg/dL 110*   SODIUM mmol/L 137   POTASSIUM mmol/L 3.8   CHLORIDE mmol/L 105   CO2 mmol/L 25   ANION GAP mmol/L 11   BUN mg/dL 30*   CREATININE mg/dL 1.13   EGFR mL/min/1.73m*2 67   CALCIUM mg/dL 9.2   ALBUMIN g/dL 4.1      Results from last 72 hours   Lab Units 07/17/24 0104 07/16/24  1905   WBC AUTO x10*3/uL 24.9* 20.0*   NRBC AUTO /100 WBCs 0.0 0.0   RBC AUTO x10*6/uL 4.41* 4.62   HEMOGLOBIN g/dL 12.4* 12.8*   HEMATOCRIT % 38.8* 40.5*   MCV fL 88 88   MCH pg 28.1 27.7   MCHC g/dL 32.0 31.6*   RDW % 14.2 14.3   PLATELETS AUTO x10*3/uL 238 256      Results from last 72 hours   Lab Units 07/17/24 0104 07/16/24  1905   WBC AUTO x10*3/uL 24.9* 20.0*   NRBC AUTO /100 WBCs 0.0 0.0   RBC AUTO x10*6/uL 4.41* 4.62   HEMOGLOBIN g/dL 12.4* 12.8*   HEMATOCRIT % 38.8* 40.5*   MCV fL 88 88   MCH pg 28.1 27.7   MCHC g/dL 32.0 31.6*   RDW % 14.2 14.3    PLATELETS AUTO x10*3/uL 238 256   NEUTROS PCT AUTO %  --  81.8   IG PCT AUTO %  --  0.4   LYMPHS PCT AUTO %  --  10.8   MONOS PCT AUTO %  --  6.2   EOS PCT AUTO %  --  0.5   BASOS PCT AUTO %  --  0.3   NEUTROS ABS x10*3/uL  --  16.37*   IG AUTO x10*3/uL  --  0.07   LYMPHS ABS AUTO x10*3/uL  --  2.16   MONOS ABS AUTO x10*3/uL  --  1.23*   EOS ABS AUTO x10*3/uL  --  0.09   BASOS ABS AUTO x10*3/uL  --  0.05      Results from last 72 hours   Lab Units 07/17/24  0104 07/16/24  1905   PROTIME seconds 15.6* 14.9*   INR  1.4* 1.3*   APTT seconds 25*  --       Results from last 72 hours   Lab Units 07/16/24  1905   ALK PHOS U/L 73   BILIRUBIN TOTAL mg/dL 0.4   PROTEIN TOTAL g/dL 8.0   ALT U/L 22   AST U/L 20   ALBUMIN g/dL 4.1     IMAGING RESULTS:  Transthoracic Echo (TTE) Complete    (Results Pending)   XR chest 1 view    (Results Pending)   CT head wo IV contrast    (Results Pending)      Assessment/Plan    79 year-old male with past medical history of hypertension, hyperlipidemia, CAD, OA, and BPH who presented as OSH transfer for management of hemorrhagic transformation of left MCA infarct. Patient presented to OSH ED as stroke code, with new onset aphasia. W 7/16 1700. Initial NIHSS 11. CTH demonstrated left MCA infarct involving insular cortex and frontal operculum. TNK given at 7/16 1850. Neurologic exam changed around midnight with new left gaze preference and vomiting. NIHSS 26. CTH demonstrated new hematoma in left frontal lobe and diffuse SAH in bilateral cerebral hemispheres, cerebellum, and basilar cisterns with mild to moderate IVH. Patient was intubated, given 10u of cryoprecipitate, and started on nicardipine infusion at OSH prior to transfer to [facility].    NEURO:  #Left MCA infarct s/p TNK complicated by hemorrhagic transformation   Assessment:  - Neurologically: see above  - 7/16: TNK given at 1850  - CTH demonstrated new hematoma (4x5.8x4.2cm) in left frontal lobe and diffuse SAH in bilateral cerebral  hemispheres, cerebellum, and basilar cisterns with mild to moderate IVH  - S/p 10u of cryoprecipitate  - Home medication: gabapentin 300mg every morning, 600mg every night  Plan:  - NSU  - Neuro Checks: Q1H  - Neurosurgery consulted  - Gabapentin 300mg every morning, 600mg every night  - Pain: acetaminophen PRN  - Repeat CTH STAT  - PT/OT/SLP    CARDIOVASCULAR:  #HTN, HLD  Assessment:  - Home medications: lisinopril 10mg daily, pravastatin 40mg daily  - ECHO: pending  Plan:  - Continue to monitor on telemetry  - Pravastatin 40mg daily  - BP goal: -150 mmHg (presenting SBP was 150-220 mmHg)  - Nicardipine as indicated    --> PRN: Labetalol and Hydralazine     RESPIRATORY:  #Acute respiratory insufficiency  Assessment:  - Intubated at OSH  Plan:  - Continuous pulse oximetry   - O2 PRN to maintain SpO2 > 94%, wean as tolerated  - Ventilator bundle while intubated    RENAL/:  #BPH  Assessment:  - Home medications: finasteride 5mg daily, tamsulosin 0.4mg daily  Results from last 72 hours   Lab Units 07/16/24  1905   BUN mg/dL 30*   CREATININE mg/dL 1.13   Plan:  - Monitor with daily RFP  - Finasteride 5mg daily  - Maintain indwelling catheter for: critically ill patient who need accurate urinary output measurements    FEN/GI:  #No active issues  Assessment:  - Last BM: PTA  Plan:  - Monitor and replace electrolytes per protocol  - IVF: NS @ 100 mL/hr  - Diet: NPO   - Bowel regimen: Modesta-Colace    ENDOCRINE:  #Hyperglycemia  Assessment:  Results from last 7 days   Lab Units 07/17/24  0326 07/16/24  1905 07/16/24  1832   POCT GLUCOSE mg/dL 184*  --  101*   GLUCOSE mg/dL  --  110*  --    Plan:  - Accuchecks & ISS Q4H while NPO    HEMATOLOGY:  #Anemia (stable)  Assessment:  Results from last 7 days   Lab Units 07/17/24  0104 07/16/24  1905   HEMOGLOBIN g/dL 12.4* 12.8*   HEMATOCRIT % 38.8* 40.5*   PLATELETS AUTO x10*3/uL 238 256   Plan:  - Continue to monitor with daily CBC and Coag panel    INFECTIOUS  DISEASE:  #Leukocytosis  Assessment:  Results from last 7 days   Lab Units 24  0104 24  1905   WBC AUTO x10*3/uL 24.9* 20.0*   - Temp (24hrs), Av.5 °C (97.7 °F), Min:36.3 °C (97.4 °F), Max:36.6 °C (97.8 °F)  Plan:  - Continue to monitor for s/sx of infection  - Pan culture for temperature > 38.4 C    MUSCULOSKELETAL:  - No acute issues    SKIN:  - No acute issues  - Turns and skin care per NSU protocol    ACCESS:  - PIVs  - Left radial arterial line    PROPHYLAXIS:  - DVT Ppx: SCDs  - GI Ppx: Pantoprazole    RESTRAINTS:  I agree with nursing assessment of the patient’s need for restraints to protect the patient from injury and facilitate healing. The patient is unable to cooperate with the plan of care and at risk for disrupting critical therapy (i.e., removing medical devices, lines, tubes and/or dressings). Please see order for specifics.    Restraints can be removed when the patient is able to cooperate with plan of care and allow healing to occur, or the medical devices at risk are discontinued by the medical team.     Wiliam Neal, APRN-CNP  Neuroscience Intensive Care    Total critical care time of 60 minutes, with > 50% of time spent in direct contact with patient/family for education, counseling and coordination of care.

## 2024-07-17 NOTE — TELEPHONE ENCOUNTER
Tried to call patient x2 to discuss results. No answer or callback. There is no acute findings. He can follow up with pain management, PCP if needed.

## 2024-07-17 NOTE — ED NOTES
RT called down to ED to assist in intubation. Critical transport and Dr. Powell in room to attempt intubation, RT on standby. Patient intubated with 7.0 ETT, 27 MARK by Dr. Powell with critical transport assist. Patient placed on critical transport vent.

## 2024-07-17 NOTE — CARE PLAN
Problem: Skin  Goal: Decreased wound size/increased tissue granulation at next dressing change  Outcome: Progressing  Goal: Participates in plan/prevention/treatment measures  Outcome: Progressing  Goal: Prevent/manage excess moisture  Outcome: Progressing  Goal: Prevent/minimize sheer/friction injuries  Outcome: Progressing  Goal: Promote/optimize nutrition  Outcome: Progressing  Goal: Promote skin healing  Outcome: Progressing     Problem: General Stroke  Goal: Establish a mutual long term goal with patient by discharge  Outcome: Progressing  Goal: Demonstrate improvement in neurological exam throughout the shift  Outcome: Progressing  Goal: Maintain BP within ordered limits throughout shift  Outcome: Progressing  Goal: Participate in treatment (ie., meds, therapy) throughout shift  Outcome: Progressing  Goal: No symptoms of aspiration throughout shift  Outcome: Progressing  Goal: No symptoms of hemorrhage throughout shift  Outcome: Progressing  Goal: Controlled blood glucose throughout shift  Outcome: Progressing       Problem: ICU Stroke    Goal: Tolerate ventilator weaning trial during shift  Outcome: Progressing  Goal: Maintain patent airway throughout shift  Outcome: Progressing  Goal: Achieve/maintain targeted sodium level throughout shift  Outcome: Progressing     Problem: Safety - Medical Restraint  Goal: Remains free of injury from restraints (Restraint for Interference with Medical Device)  Outcome: Progressing  Goal: Free from restraint(s) (Restraint for Interference with Medical Device)  Outcome: Progressing     Problem: Fall/Injury  Goal: Not fall by end of shift  Outcome: Progressing  Goal: Be free from injury by end of the shift  Outcome: Progressing  Goal: Verbalize understanding of personal risk factors for fall in the hospital  Outcome: Progressing  Goal: Pace activities to prevent fatigue by end of the shift  Outcome: Progressing     Problem: Knowledge Deficit  Goal: Patient/family/caregiver  demonstrates understanding of disease process, treatment plan, medications, and discharge instructions  Outcome: Progressing     Problem: Mechanical Ventilation  Goal: Patient Will Maintain Patent Airway  Outcome: Progressing  Goal: Oral health is maintained or improved  Outcome: Progressing  Goal: ET tube will be managed safely  Outcome: Progressing  Goal: Ability to express needs and understand communication  Outcome: Progressing  Goal: Mobility/activity is maintained at optimum level for patient  Outcome: Progressing

## 2024-07-17 NOTE — PROCEDURES
The patient was prepped and draped in the usual sterile manner using chlorhexidine scrub. The left radial artery was palpated and a 20g angiocath was used to cannulate the vessel. Pulsatile, arterial blood was visualized and a wire was passed through the needle into the lumen of the vessel. The needle was removed and the catheter then advanced over the wire. A pressure transducer was then put in line that revealed a good wave-form. The patient tolerated the procedure well. Hematoma noted at the insertion site. The area was cleaned and a transparent dressing was applied.

## 2024-07-17 NOTE — PROGRESS NOTES
"Spiritual Care Visit    Clinical Encounter Type  Visited With: Family  Routine Visit: Introduction  Continue Visiting: Yes  Crisis Visit: Critical care  Referral From: Nurse  Referral To:     Orthodoxy Encounters  Orthodoxy Needs: Prayer    Values/Beliefs  Cultural Requests During Hospitalization: none noted  Spiritual Requests During Hospitalization: prayer, support    Sacramental Encounters  Communion: Patient is currently unable  Communion Given Indicator: No         Family Spiritual Care Encounters  Family Coping: Sadness  Family Participation in Care: Consistently demonstrated  Family Support During Treatment: Consistently demonstrated  Caregiver-Patient Relationship: Not compromised         PC-7 Assessment (Level of Unmet Needs)  Existential Struggle: Further assess  Spiritual/Orthodoxy Struggle: Further assess  Legacy: Further assess  Relationships: Further assess  Fear of Death/Dying: Further assess  Values/Medical Decision Making: Further assess  Ritual/Other: Further assess  PC-7 Score: 0         Taxonomy  Intended Effects: Convey a calming presence, Demonstrate caring and concern  Methods: Offer spiritual/Alevism support  Interventions: Ask guided questions, Active listening, Prayer for healing, [place]  Initial spiritual care visit with family of patient. Patient intubated. Family bedside and awaiting other family members to arrive from out of town.  listened as family engaged in some life review and story telling. Patient is the oldest of four brothers. He is an  and has quite a \"sense of humor\" the family shared. Of course they are concerned and worried and are sad because of this situation.  provided a non anxious, supportive presence and offered prayer, which was accepted and appreciated.  explained our services and on call availability. Family expressed gratitude and thanks.  will follow.    "

## 2024-07-17 NOTE — NURSING NOTE
Nutritional support for cortrak placement. X 4 attempts were made total in both the R and L nare. Significant resistance met after 15 cm.  Appears to be coiling in the back of the throat.  Plan is to transition patient to comfort care but family wanting feeding tube so patient could continue to receive his Neurontin.  Alternative solution would to place a OG/NG.  Updated bedside nurse and she will reach out to the primary service.

## 2024-07-17 NOTE — CARE PLAN
The patient's goals for the shift include      The clinical goals for the shift include remain hemodynamically stable    Over the shift, the patient did not make progress toward the following goals. Barriers to progression include pt needed drip Recommendations to address these barriers include add oral meds.    Pt admitted with skin intact except small skin tear on right forearm.

## 2024-07-17 NOTE — SIGNIFICANT EVENT
Spoke to family (Wife/POA, Son Nilson, and daughter in law) at bedside regarding change in most recent CT scan (increase in IVH and IPH) and some fluctuations in neuro exam (not following commands on left side, still moving L side spontaneously, rest of neuro exam stable). Son brought up father's comfort and wishes to not endure compressions. Goals include to have rest of family (other son) come to bedside. Decided to avoid compressions while maintaining intubation and providing pressors if necessary. Decided to further pursue comfort as well. Patient will not be officially comfort care but will escalate treatment options if patient is showing signs of pain, avoiding further imaging or physical disturbances (for example, many neurological exams). Once family has arrived, will further pursue comfort care measures.    Alicia Fung MD  Department of Neurology, PGY-2

## 2024-07-17 NOTE — PROGRESS NOTES
Physical Therapy                 Therapy Communication Note    Patient Name: Diego Dumont  MRN: [id number]  Today's Date: 7/17/2024     Discipline: Physical Therapy    Missed Visit Reason: Missed Visit Reason:  (Pt pending GOC discussion. Plan to hold PT at this time.)    Missed Time: Attempt

## 2024-07-17 NOTE — PROGRESS NOTES
"Diego Dumont is a 79 y.o. male on day 0 of admission presenting with abrupt onset of aphasia and right-sided weakness.  Patient had initially been evaluated by Dr. Magaña.  Please see his documentation for full details of the H&P.  Patient had been treated with tenecteplase and transfer was arranged to [facility] for definitive management.  Patient was signed out to me at change of shift pending bed assignment and transport.      Subjective   History is obtained from the patient's son and wife who are present at the bedside as the patient is completely aphasic at this time.  Patient's son notes he is actually been having difficulty with mobility in his right arm for a couple of days, but they thought it might be related to an injury from using a drill.  The speech deficit was new and acute.       Objective     Physical Exam  Vitals reviewed.   HENT:      Head: Normocephalic and atraumatic.      Mouth/Throat:      Mouth: Mucous membranes are moist.   Eyes:      Extraocular Movements: Extraocular movements intact.      Conjunctiva/sclera: Conjunctivae normal.      Pupils: Pupils are equal, round, and reactive to light.   Cardiovascular:      Rate and Rhythm: Normal rate and regular rhythm.   Pulmonary:      Effort: Pulmonary effort is normal.      Breath sounds: Normal breath sounds.   Skin:     General: Skin is warm and dry.   Neurological:      Mental Status: He is alert.      Comments: See [facility]         Last Recorded Vitals  Blood pressure 154/83, pulse 79, temperature 36.6 °C (97.8 °F), temperature source Temporal, resp. rate 20, height 1.727 m (5' 8\"), weight 70.4 kg (155 lb 3.3 oz), SpO2 95%.  Intake/Output last 3 Shifts:  No intake/output data recorded.    Relevant Results              Labs Reviewed   CBC WITH AUTO DIFFERENTIAL - Abnormal       Result Value    WBC 20.0 (*)     nRBC 0.0      RBC 4.62      Hemoglobin 12.8 (*)     Hematocrit 40.5 (*)     MCV 88      MCH 27.7      MCHC 31.6 (*)     RDW " 14.3      Platelets 256      Neutrophils % 81.8      Immature Granulocytes %, Automated 0.4      Lymphocytes % 10.8      Monocytes % 6.2      Eosinophils % 0.5      Basophils % 0.3      Neutrophils Absolute 16.37 (*)     Immature Granulocytes Absolute, Automated 0.07      Lymphocytes Absolute 2.16      Monocytes Absolute 1.23 (*)     Eosinophils Absolute 0.09      Basophils Absolute 0.05     COMPREHENSIVE METABOLIC PANEL - Abnormal    Glucose 110 (*)     Sodium 137      Potassium 3.8      Chloride 105      Bicarbonate 25      Anion Gap 11      Urea Nitrogen 30 (*)     Creatinine 1.13      eGFR 67      Calcium 9.2      Albumin 4.1      Alkaline Phosphatase 73      Total Protein 8.0      AST 20      Bilirubin, Total 0.4      ALT 22     PROTIME-INR - Abnormal    Protime 14.9 (*)     INR 1.3 (*)    URINALYSIS WITH REFLEX MICROSCOPIC - Abnormal    Color, Urine Yellow      Appearance, Urine Hazy (*)     Specific Gravity, Urine 1.020      pH, Urine 5.0      Protein, Urine NEGATIVE      Glucose, Urine NEGATIVE      Blood, Urine LARGE (3+) (*)     Ketones, Urine NEGATIVE      Bilirubin, Urine NEGATIVE      Urobilinogen, Urine <2.0      Nitrite, Urine NEGATIVE      Leukocyte Esterase, Urine LARGE (3+) (*)    MICROSCOPIC ONLY, URINE - Abnormal    WBC, Urine 11-20 (*)     RBC, Urine >20 (*)     Bacteria, Urine 1+ (*)     Mucus, Urine FEW     POCT GLUCOSE - Abnormal    POCT Glucose 101 (*)    SERIAL TROPONIN-INITIAL - Normal    Troponin I, High Sensitivity 8      Narrative:     Less than 99th percentile of normal range cutoff-  Female and children under 18 years old <14 ng/L; Male <21 ng/L: Negative  Repeat testing should be performed if clinically indicated.     Female and children under 18 years old 14-50 ng/L; Male 21-50 ng/L:  Consistent with possible cardiac damage and possible increased clinical   risk. Serial measurements may help to assess extent of myocardial damage.     >50 ng/L: Consistent with cardiac damage,  increased clinical risk and  myocardial infarction. Serial measurements may help assess extent of   myocardial damage.      NOTE: Children less than 1 year old may have higher baseline troponin   levels and results should be interpreted in conjunction with the overall   clinical context.     NOTE: Troponin I testing is performed using a different   testing methodology at [facility] than at other   [facility]. Direct result comparisons should only   be made within the same method.   SERIAL TROPONIN, 1 HOUR - Normal    Troponin I, High Sensitivity 8      Narrative:     Less than 99th percentile of normal range cutoff-  Female and children under 18 years old <14 ng/L; Male <21 ng/L: Negative  Repeat testing should be performed if clinically indicated.     Female and children under 18 years old 14-50 ng/L; Male 21-50 ng/L:  Consistent with possible cardiac damage and possible increased clinical   risk. Serial measurements may help to assess extent of myocardial damage.     >50 ng/L: Consistent with cardiac damage, increased clinical risk and  myocardial infarction. Serial measurements may help assess extent of   myocardial damage.      NOTE: Children less than 1 year old may have higher baseline troponin   levels and results should be interpreted in conjunction with the overall   clinical context.     NOTE: Troponin I testing is performed using a different   testing methodology at [facility] than at other   [facility]. Direct result comparisons should only   be made within the same method.   GRAY TOP    Extra Tube Hold for add-ons.     TROPONIN SERIES- (INITIAL, 1 HR)    Narrative:     The following orders were created for panel order Troponin Series, (0, 1 HR).  Procedure                               Abnormality         Status                     ---------                               -----------         ------                     Troponin I, High Sensiti...[[id number]]  Normal               Final result               Troponin, High Sensitivi...[[id number]]  Normal              Final result                 Please view results for these tests on the individual orders.     CT brain attack angio head and neck W and WO IV contrast   Final Result   1. No intracranial large vessel arterial branch occlusion or   hemodynamically significant stenosis.        2. No significant stenosis of the cervical carotid or vertebral   arteries.        3. Stable findings of acute left MCA territory infarct involving the   left frontal lobe and insular cortex.        MACRO:   None        Signed by: Teresa Seals 7/16/2024 8:06 PM   Dictation workstation:   [id number]      CT brain attack head wo IV contrast   Final Result   Evidence of acute left MCA territory infarct involving the insular   cortex and frontal operculum. Hyperdense sign involving an M2 branch   in the left sylvian fissure, suspicious for thrombosis. CT   angiography is recommended to assess for large vessel occlusion.        No acute intracranial hemorrhage.             MACRO:   Teresa Seals discussed the significance and urgency of this critical   finding by telephone with  FELISHA HAWK on 7/16/2024 at 7:01 pm.   (**-RCF-**) Findings:  See findings.        Signed by: Teresa Seals 7/16/2024 7:06 PM   Dictation workstation:   [id number]        ED Medication Administration from 07/16/2024 1817 to 07/16/2024 2251         Date/Time Order Dose Route Action Action by     07/16/2024 1850 EDT tenecteplase (TNKASE) injection for STROKE 18 mg 18 mg intravenous Given Armando S     07/16/2024 1908 EDT dextrose 5%-0.45 % sodium chloride infusion 75 mL/hr intravenous New Bag Armando S     07/16/2024 1928 EDT iohexol (OMNIPaque) 350 mg iodine/mL solution 75 mL 75 mL intravenous Given Ferl, T     07/16/2024 2023 EDT ondansetron (Zofran) injection 4 mg 4 mg intravenous Given Perez, S     07/16/2024 2026 EDT metoprolol tartrate (Lopressor) injection 5 mg 5 mg  intravenous Given Perez, S     07/16/2024 2130 EDT metoprolol tartrate (Lopressor) injection 5 mg 5 mg intravenous Given Armando, S     07/16/2024 2154 EDT metoprolol tartrate (Lopressor) injection 5 mg 5 mg intravenous Given Nazaninm, S                         Assessment/Plan   Active Problems:    Cerebrovascular accident (CVA) due to occlusion of left middle cerebral artery (Multi)    This patient presents emergency department with the above history and physical.  MCA stroke was identified on CAT scan and confirmed on CT angio.  Patient had already received tenecteplase prior to change of shift.  Blood pressure has been controlled by intermittent doses of metoprolol.  Patient has demonstrated some mild increase in right-sided weakness, although accuracy of exam is somewhat impaired due to the patient's aphasia making it unclear whether he entirely understands the instructions of the exam.  No change in his global aphasia.    The additional information the patient actually had some symptoms that may be attributable to ischemia for the past couple of days may explain why we are not seeing abrupt improvement from the tenecteplase.  There has been no abrupt deterioration to suggest post tenecteplase bleeding.    I did communicate to the patient's family at the bedside that his status is very tenuous given the large territory of the infarct and lack of initial response to the tenecteplase.  They understand that further evaluation and treatment will occur on their arrival to [facility], and expressed understanding and agreement with the plan.    Will continue the patient under safe supervision in the emergency department with management of blood pressure and reassessing neurologic status pending transport.       I spent 37 minutes in the professional and overall care of this patient.      Natalia Powell MD

## 2024-07-17 NOTE — SIGNIFICANT EVENT
I agree with nursing assessment of the patient’s need for restraints to protect the patient from injury and facilitate healing. The patient is unable to cooperate with the plan of care and at risk for disrupting critical therapy (i.e., removing medical devices, lines, tubes and/or dressings). Please see order for specifics.    Restraints can be removed when the patient is able to cooperate with plan of care and allow healing to occur, or the medical devices at risk are discontinued by the medical team.

## 2024-07-17 NOTE — PROGRESS NOTES
Pharmacy Medication History Review    Diego Dumont is a 79 y.o. male admitted for Hemorrhagic stroke (Multi). Pharmacy reviewed the patient's [id number] medications and allergies for accuracy.    The list below reflects the updated PTA list. Comments regarding how patient may be taking medications differently can be found in the Admit Orders Activity  Prior to Admission Medications   Prescriptions Last Dose Informant   acetaminophen (Tylenol 8 HOUR) 650 mg ER tablet Unknown Self   Sig: Take 1 tablet (650 mg) by mouth 3 times a day as needed.   cycloSPORINE (Restasis) 0.05 % ophthalmic emulsion Unknown Self   Sig: Administer into affected eye(s) every 12 hours.   diclofenac sodium 3 % gel Unknown Self   Sig: Apply sparingly to affected areas twice a day   finasteride (Proscar) 5 mg tablet Unknown Self   Sig: TAKE ONE TABLET BY MOUTH ONCE DAILY   gabapentin (Neurontin) 600 mg tablet Unknown Self   Sig: TAKE ONE TABLET IN THE MORNING AND TAKE TWO TABLETS AT BEDTIME   lidocaine 4 % cream Unknown Self   Sig: USE TOPICALLY AS DIRECTED.   lisinopril 10 mg tablet Unknown Self   Sig: TAKE ONE TABLET BY MOUTH ONCE DAILY   naproxen (Naprosyn) 500 mg tablet Not Taking Self   Sig: Take 1 tablet (500 mg) by mouth 2 times a day.   Patient not taking: Reported on 7/17/2024   pravastatin (Pravachol) 40 mg tablet Unknown Self   Sig: Take 1 tablet (40 mg) by mouth once daily.   tamsulosin (Flomax) 0.4 mg 24 hr capsule Unknown Self   Sig: Take 1 capsule (0.4 mg) by mouth once daily at bedtime.      Facility-Administered Medications: None        The list below reflects the updated allergy list. Please review each documented allergy for additional clarification and justification.  Allergies  Reviewed by Keanu Mei PharmD on 7/17/2024   No Known Allergies         Medications ADDED:  none  Medications CHANGED:  none  Medications REMOVED:   none     Patient declines M2B at discharge. Pharmacy has been updated to [facility]  in [place].      Sources used to complete the med history include   Medication dispense history  [facility] - gabapentin  Family interview. Fair historian, was able to confirm names and indications but unsure of dosing    Below are additional concerns with the patient's PTA list.  - Per family, patient recently completed a course of Keflex for UTI    Cornelia Mei, PharmD   Meds PGY1 Pharmacy Resident    Meds Ambulatory and Retail Services  Please reach out via [employer] Secure Chat for questions, or if no response call [phone] or [employer] “MedRec”

## 2024-07-17 NOTE — PROGRESS NOTES
[facility]  NEUROSCIENCE INTENSIVE CARE UNIT  DAILY PROGRESS NOTE       Patient Name: Diego Dumont   MRN: [id number]     Admit Date: 2024     : 1945 AGE: 79 y.o. GENDER: male        Subjective    79 year-old male with past medical history of hypertension, hyperlipidemia, CAD, OA, and BPH who presented as OSH transfer for management of hemorrhagic transformation of left MCA infarct. Patient presented to OSH ED as stroke code, with new onset aphasia. LKW  1700. Initial NIHSS 11. CTH demonstrated left MCA infarct involving insular cortex and frontal operculum. TNK given at  1850. Neurologic exam changed around midnight with new left gaze preference and vomiting. NIHSS 26. CTH demonstrated new hematoma in left frontal lobe and diffuse SAH in bilateral cerebral hemispheres, cerebellum, and basilar cisterns with mild to moderate IVH. Patient was intubated, given 10u of cryoprecipitate, and started on nicardipine infusion at OSH prior to transfer to [facility].     Interval Events: Admitted to NSU. Pending GOC.    Patient on ventilator without sedation. Responds to verbal stimuli and follows commands. Difficult to respond to questions. No objective signs of discomfort at this time.    Spoke to family at bedside (mother and son). Family is aware of patients wishes and plan to switch code status once rest of family arrives.     Objective   VITALS (24H):  Temp:  [36.2 °C (97.2 °F)-36.6 °C (97.8 °F)] 36.2 °C (97.2 °F)  Heart Rate:  [] 85  Resp:  [10-35] 16  BP: (119-192)/() 149/76  Arterial Line BP 1: (141-174)/(52-83) 141/52  FiO2 (%):  [28 %-100 %] 100 %  INTAKE/OUTPUT:  Intake/Output Summary (Last 24 hours) at 2024 0655  Last data filed at 2024 0600  Gross per 24 hour   Intake --   Output 1400 ml   Net -1400 ml     VENT SETTINGS:  Vent Mode: Volume control/assist control  FiO2 (%):  [28 %-100 %] 100 %  S RR:  [16] 16  S VT:  [450 mL] 450 mL  PEEP/CPAP (cm H2O):  [5 cm  H20] 5 cm H20  MAP (cm H2O):  [9] 9     PHYSICAL EXAM:  NEURO:  NEURO:  - Responds to verbal stimuli, follows commands, midline gaze, 2mm/reactive bilaterally  - Left corneal present, right corneal absent, positive gag, unable to assess cough (trouble passing ET suction)  - BLE flicker withdraw to nox stim, RUE flaccid, LUE spontaneous  CV:  - RRR on telemetry, NSR-ST  - Left radial arterial line in place  RESP:  - Regular, unlabored  - Oxygen: ventilator VC FiO2 100%, PEEP 5, RR 16,   :  - Indwelling catheter in place  GI:  - Abdomen NT/ND, soft  SKIN:  - Intact    MEDICATIONS:  Scheduled: PRN: Continuous:   finasteride, 5 mg, oral, Daily  gabapentin, 300 mg, nasogastric tube, q AM  gabapentin, 600 mg, nasogastric tube, Nightly  insulin lispro, 0-5 Units, subcutaneous, q4h  oxygen, , inhalation, Continuous - Inhalation  pantoprazole, 40 mg, intravenous, Daily  perflutren lipid microspheres, 0.5-10 mL of dilution, intravenous, Once in imaging  perflutren protein A microsphere, 0.5 mL, intravenous, Once in imaging  pravastatin, 40 mg, nasogastric tube, Daily  sennosides-docusate sodium, 2 tablet, oral, BID  sulfur hexafluoride microsphr, 2 mL, intravenous, Once in imaging     PRN medications: acetaminophen **OR** acetaminophen **OR** acetaminophen, dextrose, dextrose, glucagon, glucagon, hydrALAZINE **FOLLOWED BY** [START ON 7/19/2024] hydrALAZINE, labetaloL, oxygen niCARdipine, 2.5-15 mg/hr, Last Rate: 12.5 mg/hr (07/17/24 0615)  sodium chloride 0.9%, 100 mL/hr, Last Rate: 100 mL/hr (07/17/24 0428)         LAB RESULTS:  Results from last 72 hours   Lab Units 07/17/24  0306 07/17/24  0104 07/16/24  1905   WBC AUTO x10*3/uL 21.5* 24.9* 20.0*   NRBC AUTO /100 WBCs 0.0 0.0 0.0   RBC AUTO x10*6/uL 3.98* 4.41* 4.62   HEMOGLOBIN g/dL 11.1* 12.4* 12.8*   HEMATOCRIT % 32.8* 38.8* 40.5*   MCV fL 82 88 88   MCH pg 27.9 28.1 27.7   MCHC g/dL 33.8 32.0 31.6*   RDW % 14.1 14.2 14.3   PLATELETS AUTO x10*3/uL 233 238 256    NEUTROS PCT AUTO % 88.8  --  81.8   IG PCT AUTO % 0.7  --  0.4   LYMPHS PCT AUTO % 4.1  --  10.8   MONOS PCT AUTO % 6.3  --  6.2   EOS PCT AUTO % 0.0  --  0.5   BASOS PCT AUTO % 0.1  --  0.3   NEUTROS ABS x10*3/uL 19.13*  --  16.37*   IG AUTO x10*3/uL 0.15  --  0.07   LYMPHS ABS AUTO x10*3/uL 0.88  --  2.16   MONOS ABS AUTO x10*3/uL 1.35*  --  1.23*   EOS ABS AUTO x10*3/uL 0.00  --  0.09   BASOS ABS AUTO x10*3/uL 0.03  --  0.05       Results from last 72 hours   Lab Units 07/17/24  0306 07/16/24  1905   GLUCOSE mg/dL 182* 110*   SODIUM mmol/L 138 137   POTASSIUM mmol/L 3.7 3.8   CHLORIDE mmol/L 105 105   CO2 mmol/L 22 25   ANION GAP mmol/L 15 11   BUN mg/dL 26* 30*   CREATININE mg/dL 1.03 1.13   EGFR mL/min/1.73m*2 74 67   CALCIUM mg/dL 8.6 9.2   PHOSPHORUS mg/dL 3.6  --    ALBUMIN g/dL 3.6 4.1   MAGNESIUM mg/dL 1.94  --    POCT CALCIUM IONIZED (MMOL/L) IN BLOOD mmol/L 1.11  --       IMAGING RESULTS:  CT head wo IV contrast   Final Result   There is again evidence of a large area of irregular hyperdense   intraparenchymal hemorrhage within the lateral left frontal lobe   involving the left frontal lobe operculum and extending caudally into   the left subinsular region similar in appearance when compared with   the prior study dated 07/17/2024. There is surrounding hypodensity   involving cortex underlying white matter compatible with surrounding   edema with a prior CT of the head dated 07/16/2024 demonstrating   subtle asymmetric hypodensity involving cortex and underlying white   matter within left frontal lobe and left insula compatible with an   evolving area of acute infarction suggesting the intraparenchymal   hemorrhage may represent hemorrhagic transformation of the evolving   infarction. There is again evidence of mass effect within the left   cerebral hemisphere contributing to partial effacement of the left   lateral ventricle with mild bowing of the midline structures from   left to right.        There  is again evidence of extensive hyperdense subarachnoid   hemorrhage along sulci of the cerebral hemispheres bilaterally left   greater than right similar when compared with the prior study.        There is again evidence of hyperdense intraventricular hemorrhage   best appreciated within the lateral ventricles left greater than   right. There is mild interval increased intraventricular hemorrhage   within the lateral ventricles when compared with the prior study   dated 07/17/2024 time 12:23 a.m.. There is again evidence of   hyperdense intraventricular hemorrhage within the 4th ventricle   slightly less conspicuous on the current study although the posterior   fossa is suboptimally evaluated secondary to metallic artifact from   the patient's dental hardware.        The ventricular system is similar in size and configuration without   evidence of magaly hydrocephalus.        MACRO:   None.        Signed by: Robin Gonzalez 7/17/2024 5:08 AM   Dictation workstation:   [id number]      XR chest 1 view         Transthoracic Echo (TTE) Complete    (Results Pending)         Assessment/Plan    NEURO:  #Left MCA infarct s/p TNK complicated by hemorrhagic transformation   Assessment:  - Neurologically: see above  - 7/16: TNK given at 1850  - CTH demonstrated new hematoma (4x5.8x4.2cm) in left frontal lobe and diffuse SAH in bilateral cerebral hemispheres, cerebellum, and basilar cisterns with mild to moderate IVH  - CTH 7/17 4AM with stable hyperdense intraparenchymal hemorrhage, MLS, hyperdense subarachnoid hemorrhage L>R, IVH; mild increase IVH in lateral ventricles compared to 12AM study  - S/p 10u of cryoprecipitate  - Home medication: gabapentin 300mg every morning, 600mg every night  Plan:  - NSU  - Neuro Checks: Q1H  - Neurosurgery consulted  - finalize GOC at when rest of family arrives, maintain full code for now  - Gabapentin 300mg every morning, 600mg every night  - Pain: acetaminophen PRN  - Repeat CTH or MRI at  10AM  - PT/OT/SLP    CARDIOVASCULAR:  #HTN, HLD  Assessment:  - Home medications: lisinopril 10mg daily, pravastatin 40mg daily  - ECHO: pending  Plan:  - Continue to monitor on telemetry  - Pravastatin 40mg daily  - BP goal: -150 mmHg (presenting SBP was 150-220 mmHg)  - On Nicardipine drip 0.0024mg/kg/min    --> PRN: Labetalol and Hydralazine     RESPIRATORY:  #Acute respiratory insufficiency  Assessment:  - Intubated at OSH  - Vent settings: VC FiO2 100%, PEEP 5, RR 16,   Plan:  - Continuous pulse oximetry   - O2 PRN to maintain SpO2 > 94%, wean as tolerated  - Ventilator bundle while intubated  - GOC at 6PM    RENAL/:  #BPH  Assessment:  - Home medications: finasteride 5mg daily, tamsulosin 0.4mg daily   Results from last 72 hours   Lab Units 07/17/24  0306 07/16/24  1905   BUN mg/dL 26* 30*   CREATININE mg/dL 1.03 1.13       Plan:  - Monitor with daily RFP  - Finasteride 5mg daily  - Maintain indwelling catheter for: critically ill patient who need accurate urinary output measurements    FEN/GI:  #No active issues  Assessment:  - Last BM: PTA  Plan:  - Monitor and replace electrolytes per protocol  - IVF: NS @ 100 mL/hr  - Diet: NPO   - Bowel regimen: Mod[place]-Colace    ENDOCRINE:  #Hyperglycemia  Assessment:  Results from last 7 days   Lab Units 07/17/24  0326 07/17/24  0306 07/16/24  1905 07/16/24  1832   POCT GLUCOSE mg/dL 184*  --   --  101*   GLUCOSE mg/dL  --  182* 110*  --       Plan:  - Accuchecks & ISS Q4H while NPO    HEMATOLOGY:  #Anemia, most likely dilutional and iso hemorrhagic bleed  Assessment:  Results from last 7 days   Lab Units 07/17/24  0306 07/17/24  0104   HEMOGLOBIN g/dL 11.1* 12.4*   HEMATOCRIT % 32.8* 38.8*   PLATELETS AUTO x10*3/uL 233 238     Plan:  - Continue to monitor with daily CBC and Coag panel    INFECTIOUS DISEASE:  #Leukocytosis  Assessment:  Results from last 7 days   Lab Units 07/17/24  0306 07/17/24  0104   WBC AUTO x10*3/uL 21.5* 24.9*    - Temp (24hrs),  Av.4 °C (97.6 °F), Min:36.2 °C (97.2 °F), Max:36.6 °C (97.8 °F)    Plan:  - Continue to monitor for s/sx of infection  - Pan culture for temperature > 38.4 C    MUSCULOSKELETAL:  - No acute issues    SKIN:  - No acute issues  - Turns and skin care per NSU protocol    ACCESS:  - PIVs  - Left radial arterial line    PROPHYLAXIS:  - DVT Ppx: SCDs  - GI Ppx: Pantoprazole    RESTRAINTS:  I agree with nursing assessment of the patient's need for restraints to protect the patient from injury and facilitate healing. The patient is unable to cooperate with the plan of care and at risk for disrupting critical therapy (i.e., removing medical devices, lines, tubes and/or dressings).  Please see order for specifics. Restraints can be removed when the patient is able to cooperate with plan of care and allow healing to occur, or the medical devices at risk are discontinued by the medical team.     Alicia Fung MD  Neuroscience Intensive Care       Plan of care to be discussed with neurocritical care attending     The patient is critically ill with acute encephalopathy, acute respiratory insufficiency, and hemorrhagic transformation following thrombolysis for MCA stroke  Remains in poor neurological condition  Cont BP control with goal sbp<160  Fibrinogen repleted  Wean ventilator as tolerated    I have seen and examined the patient.  I have reviewed the patient's laboratory, radiographic, and clinical data.  I have spent 30 minutes providing critical care for the patient.      FLO Cornejo M.D.

## 2024-07-17 NOTE — HOSPITAL COURSE
79 year-old male with past medical history of hypertension, hyperlipidemia, CAD, OA, and BPH who presented as OSH transfer for management of hemorrhagic transformation of left MCA infarct. Patient presented to OSH ED as stroke code, with new onset aphasia. LKW 7/16 1700. Initial NIHSS 11. CTH demonstrated left MCA infarct involving insular cortex and frontal operculum. TNK given at 7/16 1850. Neurologic exam changed around midnight with new left gaze preference and vomiting. NIHSS 26. CTH demonstrated new hematoma in left frontal lobe and diffuse SAH in bilateral cerebral hemispheres, cerebellum, and basilar cisterns with mild to moderate IVH. Patient was intubated, given 10u of cryoprecipitate, and started on nicardipine infusion at OSH prior to transfer to [facility]. MRI brain 7/19 with L MCA stroke and extensive SAH plus IVH; minimal dilation of ventricles.     Goals of care conversations were held with family and decision was made to transition to comfort care 7/20. Pt subsequently discharged to hospice house in [place].

## 2024-07-17 NOTE — PROGRESS NOTES
Speech-Language Pathology  Therapy Communication Note  Patient Name: Diego Dumont  MRN: [id number]  Today's Date: 7/17/2024   Time: 755    Discipline: Speech-Language Pathology    Reason: Attempt    Comment:  Orders received for clinical swallow and speech/language evaluations. Pt currently intubated and not appropriate for assessment at this time; will sign off/complete order.     Please re-consult upon extubation.

## 2024-07-17 NOTE — CONSULTS
Reason For Consult  Hemorrhagic transformation of a stroke    History Of Present Illness  Diego Dumont is a 79 y.o. male presenting with hemorrhagic transformation of stroke.    History obtained from chart.     Patient presented with acute aphasia and subacute R sided weakness, found to have L MCA stroke on CTH, CTA negative for LVO, patient was given TNK at OSH.  Patient had neurologic decline and was found to have hemorrhagic conversion of stroke, for which patient was transferred to [facility] and neurosurgery was consulted.        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    Surgical History  He has a past surgical history that includes Carpal tunnel release (08/15/2014); Other surgical history (08/15/2014); Other surgical history (06/23/2022); Other surgical history (05/11/2020); Other surgical history (07/10/2019); and Other surgical history (09/18/2014).     Social History  He reports that he has never smoked. He has never been exposed to tobacco smoke. He has never used smokeless tobacco. He reports that he does not drink alcohol and does not use drugs.    Family History  No family history on file.     Allergies  Patient has no known allergies.    Review of Systems  10 point ROS is obtained and negative except the ones mentioned in the HPI      Physical Exam  General: intubated, off sedation   HEENT: atraumatic  Neuro:  Intubated   Eyes open to  noxious stimulus  Cranial Nerves II-XII: PERRL,   Motor: Left spontaneous   Right withdraws to noxious stimulus  Sensation: unable to assess due to patient's mental status  DTRS: 2+ Throughout, No Hoffmans or Clonus   Cardiac: RRR on monitor  Respiratory: breathing  "comfortably on RA  Abdomen: non tender, non distended  Skin: warm, dry     Last Recorded Vitals  Blood pressure 122/73, pulse 63, temperature 36.2 °C (97.2 °F), resp. rate 16, height 1.727 m (5' 7.99\"), weight 70.8 kg (156 lb 1.4 oz), SpO2 100%.    Relevant Results  Imaging personally reviewed and is significant for: CTH without contrast demonstrating large patchy left sided hemorrhagic conversation of stroke with IVH.  There is mild brain compression and minimal midline shift     Assessment/Plan     Diego Dumont is a 79 yr old M w h/o HTN, HLD, CAD, POA, BPH, p/w acute aphasia, subacute R arm weakness, LNK 7/16 1700, CTH L <CA infacrt, CTA no Lvo, s/p TNK, decline in neuro exam, CTH hemorrhagic conversion of stroke, s/p intubation, [facility]    Recommendations:  Patient expected neurologic deficits from stroke  Hemorrhagic aspect causing minimal compression and midline shift   No acute neurosurgical intervention at this time   GOC and rest of care per stroke team            Antonieta Amador MD    "

## 2024-07-17 NOTE — CONSULTS
Nutrition Note:   Nutrition Assessment    Reason for Assessment: Tube feeding recommendations    Patient is a 79 y.o. male with Left MCA infarct s/p TNK complicated by hemorrhagic transformation.    Pt currently intubated - no enteral access.  RN informed RDN that plan is for a GOC discussion with family this afternoon.     Nutrition Interventions/Recommendations         Nutrition Prescription:  Individualized Nutrition Prescription Provided for : Given no access and plan for GOC discussion, full assessment with TF recs is not appropriate at this time. Please reconsult if enteral nutrition is in line with GOC.       Nutrition Monitoring and Evaluation        Time Spent (min): 20 minutes

## 2024-07-17 NOTE — H&P
History Of Present Illness  Diego Dumont is a 79 y.o. 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     He presented to [facility] as a stroke code, LKW 1700 when he suddenly had new aphasia. Per chart review the son noted that he actually may have had difficulty with mobility in his R arm for a couple of days prior, but they had thought that it was due to injury from using a drill. NIHSS was 11 (LOC-Q 2, LOC-C 1, FD 1, RUE 1, RLE 1, mute 3, dysarthria 2) on arrival to the OSH. CTH showed L MCA infarct involving the insular cortex and frontal operculum. CTA did not show LVO. TNK was given at 1850 with improvement in RUE/RLE strength. A change in exam was noted around midnight with new L gaze preference and vomiting. NIHSS was 26 - LOC 2, LOC-Q 2 ,LOC-C 2, gaze 1, FD 2,  LUE 1, RUE 4, LLE 2, RLE 3, sensory 1, mute 3, dysarthria 2, neglect 1). CTH showed new hematoma (4x5.8x4.2cm - 73cc) in L frontal lobe and diffuse SAH in bilateral cerebral hemispheres, cerebellum, and basilar cisterns. Mild to mod volume IVH. He was intubated, given 10u of cryo and started on nicardipine ggt at the OSH prior to being transferred to [facility]. On arrival to [facility] NIHSS was 22.    Imaging from OSH:   CTH 7/17 00:23:  IMPRESSION:  New large acute intraparenchymal hematoma centered in the left  frontal lobe measuring at least 4 x 5.8 x 4.2 cm, with diffuse large  volume subarachnoid hemorrhage in the bilateral cerebral hemispheres,  cerebellum and basilar cisterns. Mild-to-moderate volume  intraventricular hemorrhage.      Diffuse edema involving the left cerebral hemisphere with crowding of the basal cisterns, concerning for transtentorial/uncal herniation  and crowding of the foramen magnum concerning for tonsillar  herniation.    CTA 7/16:  IMPRESSION:  1. No intracranial large vessel arterial branch occlusion or  hemodynamically  significant stenosis.      2. No significant stenosis of the cervical carotid or vertebral  arteries.      3. Stable findings of acute left MCA territory infarct involving the  left frontal lobe and insular cortex.    CTH 7/16:  IMPRESSION:  Evidence of acute left MCA territory infarct involving the insular  cortex and frontal operculum. Hyperdense sign involving an M2 branch  in the left sylvian fissure, suspicious for thrombosis. CT  angiography is recommended to assess for large vessel occlusion.    Review of Systems:  14 point review of systems conducted and negative other than noted in HPI.    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    Personal history of other diseases of the circulatory system 08/15/2014    History of hypertension    Personal history of other endocrine, nutritional and metabolic disease 08/15/2014    History of hyperlipidemia     Surgical History  Past Surgical History:   Procedure Laterality Date    CARPAL TUNNEL RELEASE  08/15/2014    Neuroplasty Decompression Median Nerve At Carpal Tunnel    OTHER SURGICAL HISTORY  08/15/2014    Laminectomy Decompressive Up To Two Lumbar Segments    OTHER SURGICAL HISTORY  06/23/2022    Hip replacement    OTHER SURGICAL HISTORY  05/11/2020    Cataract surgery    OTHER SURGICAL HISTORY  07/10/2019    Carpal tunnel surgery    OTHER SURGICAL HISTORY  09/18/2014    Biopsy Of Soft Tissue Of The Neck     Medications  Medications Prior to Admission   Medication Sig Dispense Refill Last Dose    acetaminophen (Tylenol 8 HOUR) 650 mg ER tablet Take 1 tablet (650 mg) by mouth 3 times a day as needed.       cycloSPORINE (Restasis) 0.05 % ophthalmic emulsion Administer into affected eye(s) every 12 hours.       diclofenac sodium 3 % gel Apply sparingly  to affected areas twice a day       finasteride (Proscar) 5 mg tablet TAKE ONE TABLET BY MOUTH ONCE DAILY 90 tablet 1     gabapentin (Neurontin) 600 mg tablet TAKE ONE TABLET IN THE MORNING AND TAKE TWO TABLETS AT BEDTIME 270 tablet 3     lidocaine 4 % cream USE TOPICALLY AS DIRECTED.       lisinopril 10 mg tablet TAKE ONE TABLET BY MOUTH ONCE DAILY 90 tablet 1     naproxen (Naprosyn) 500 mg tablet Take 1 tablet (500 mg) by mouth 2 times a day. 180 tablet 1     pravastatin (Pravachol) 40 mg tablet Take 1 tablet (40 mg) by mouth once daily. 90 tablet 3     tamsulosin (Flomax) 0.4 mg 24 hr capsule Take 1 capsule (0.4 mg) by mouth once daily at bedtime. 90 capsule 3      Social History  Social History     Tobacco Use    Smoking status: Never     Passive exposure: Never    Smokeless tobacco: Never   Substance Use Topics    Alcohol use: Never    Drug use: Never     Allergies  Patient has no known allergies.    Last Recorded Vitals  There were no vitals taken for this visit.    NEUROLOGIC EXAM:  NEUROLOGIC EXAM:    MENTAL STATUS:  - Intubated, off sedation  - Eyes open, no purposeful eye tracking  - Does not follow commands    CRANIAL NERVES:  - CN I: Not Assessed  - CN II: Pupils constrict to light bilaterally 2->1 mm and equal, no clear blink to threat bilaterally  - CN III, IV, VI: No gaze deviation or preference  - CN V: Corneals intact  - CN VII: No facial droop, closes eyes against resistance  - CN VIII: VOR intact  - CN IX, X: Cough, gag intact  - CN XII: Not assessed    MOTOR:  - RUE: 0/5, LUE: 0/5, RLE: 1/5 flicker withdrawal, LLE: 1/5 flicker withdrawal    REFLEXES: Not assessed    COORDINATION: Unable to assess  SENSATION: No grimace to noxious stim x4  GAIT: Unable to assess    Relevant Results  Results for orders placed or performed during the hospital encounter of 07/16/24 (from the past 96 hour(s))   Light Blue Top   Result Value Ref Range    Extra Tube Hold for add-ons.    PST Top   Result Value Ref Range     Extra Tube Hold for add-ons.    Lavender Top   Result Value Ref Range    Extra Tube Hold for add-ons.    SST TOP   Result Value Ref Range    Extra Tube Hold for add-ons.    Gray Top   Result Value Ref Range    Extra Tube Hold for add-ons.    POCT GLUCOSE   Result Value Ref Range    POCT Glucose 101 (H) 74 - 99 mg/dL   CBC and Auto Differential   Result Value Ref Range    WBC 20.0 (H) 4.4 - 11.3 x10*3/uL    nRBC 0.0 0.0 - 0.0 /100 WBCs    RBC 4.62 4.50 - 5.90 x10*6/uL    Hemoglobin 12.8 (L) 13.5 - 17.5 g/dL    Hematocrit 40.5 (L) 41.0 - 52.0 %    MCV 88 80 - 100 fL    MCH 27.7 26.0 - 34.0 pg    MCHC 31.6 (L) 32.0 - 36.0 g/dL    RDW 14.3 11.5 - 14.5 %    Platelets 256 150 - 450 x10*3/uL    Neutrophils % 81.8 40.0 - 80.0 %    Immature Granulocytes %, Automated 0.4 0.0 - 0.9 %    Lymphocytes % 10.8 13.0 - 44.0 %    Monocytes % 6.2 2.0 - 10.0 %    Eosinophils % 0.5 0.0 - 6.0 %    Basophils % 0.3 0.0 - 2.0 %    Neutrophils Absolute 16.37 (H) 1.60 - 5.50 x10*3/uL    Immature Granulocytes Absolute, Automated 0.07 0.00 - 0.50 x10*3/uL    Lymphocytes Absolute 2.16 0.80 - 3.00 x10*3/uL    Monocytes Absolute 1.23 (H) 0.05 - 0.80 x10*3/uL    Eosinophils Absolute 0.09 0.00 - 0.40 x10*3/uL    Basophils Absolute 0.05 0.00 - 0.10 x10*3/uL   Comprehensive Metabolic Panel   Result Value Ref Range    Glucose 110 (H) 74 - 99 mg/dL    Sodium 137 136 - 145 mmol/L    Potassium 3.8 3.5 - 5.3 mmol/L    Chloride 105 98 - 107 mmol/L    Bicarbonate 25 21 - 32 mmol/L    Anion Gap 11 10 - 20 mmol/L    Urea Nitrogen 30 (H) 6 - 23 mg/dL    Creatinine 1.13 0.50 - 1.30 mg/dL    eGFR 67 >60 mL/min/1.73m*2    Calcium 9.2 8.6 - 10.3 mg/dL    Albumin 4.1 3.4 - 5.0 g/dL    Alkaline Phosphatase 73 33 - 136 U/L    Total Protein 8.0 6.4 - 8.2 g/dL    AST 20 9 - 39 U/L    Bilirubin, Total 0.4 0.0 - 1.2 mg/dL    ALT 22 10 - 52 U/L   Protime-INR   Result Value Ref Range    Protime 14.9 (H) 9.8 - 12.8 seconds    INR 1.3 (H) 0.9 - 1.1   Troponin I, High  Sensitivity, Initial   Result Value Ref Range    Troponin I, High Sensitivity 8 0 - 20 ng/L   Troponin, High Sensitivity, 1 Hour   Result Value Ref Range    Troponin I, High Sensitivity 8 0 - 20 ng/L   Urinalysis with Reflex Microscopic   Result Value Ref Range    Color, Urine Yellow Straw, Yellow    Appearance, Urine Hazy (N) Clear    Specific Gravity, Urine 1.020 1.005 - 1.035    pH, Urine 5.0 5.0, 5.5, 6.0, 6.5, 7.0, 7.5, 8.0    Protein, Urine NEGATIVE NEGATIVE mg/dL    Glucose, Urine NEGATIVE NEGATIVE mg/dL    Blood, Urine LARGE (3+) (A) NEGATIVE    Ketones, Urine NEGATIVE NEGATIVE mg/dL    Bilirubin, Urine NEGATIVE NEGATIVE    Urobilinogen, Urine <2.0 <2.0 mg/dL    Nitrite, Urine NEGATIVE NEGATIVE    Leukocyte Esterase, Urine LARGE (3+) (A) NEGATIVE   Microscopic Only, Urine   Result Value Ref Range    WBC, Urine 11-20 (A) 1-5, NONE /HPF    RBC, Urine >20 (A) NONE, 1-2, 3-5 /HPF    Bacteria, Urine 1+ (A) NONE SEEN /HPF    Mucus, Urine FEW Reference range not established. /LPF   Coagulation Screen   Result Value Ref Range    Protime 15.6 (H) 9.8 - 12.8 seconds    INR 1.4 (H) 0.9 - 1.1    aPTT 25 (L) 27 - 38 seconds   CBC   Result Value Ref Range    WBC 24.9 (H) 4.4 - 11.3 x10*3/uL    nRBC 0.0 0.0 - 0.0 /100 WBCs    RBC 4.41 (L) 4.50 - 5.90 x10*6/uL    Hemoglobin 12.4 (L) 13.5 - 17.5 g/dL    Hematocrit 38.8 (L) 41.0 - 52.0 %    MCV 88 80 - 100 fL    MCH 28.1 26.0 - 34.0 pg    MCHC 32.0 32.0 - 36.0 g/dL    RDW 14.2 11.5 - 14.5 %    Platelets 238 150 - 450 x10*3/uL   Green Top   Result Value Ref Range    Extra Tube Hold for add-ons.    Prepare Cryoprecipitated AHF (Pooled Units): 2 Pools   Result Value Ref Range    PRODUCT CODE C7612E09     Unit Number [id number]     Unit ABO O     Unit RH POS     Dispense Status IS     Blood Expiration Date 5/8/2025 11:59:00 PM EDT     PRODUCT BLOOD TYPE 5100     UNIT VOLUME 82     PRODUCT CODE C0979P48     Unit Number [id number]     Unit ABO O     Unit RH POS      Dispense Status IS     Blood Expiration Date 5/8/2025 11:59:00 PM EDT     PRODUCT BLOOD TYPE 5100     UNIT VOLUME 77    ABO/Rh   Result Value Ref Range    ABO TYPE B     Rh TYPE POS    Type and Screen   Result Value Ref Range    ABO TYPE B     Rh TYPE POS     ANTIBODY SCREEN NEG    VERIFY ABO/Rh Group Test   Result Value Ref Range    ABO TYPE B     Rh TYPE POS          I have personally reviewed the imaging results     Assessment  Diego Dumont is a 79 y.o. 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    Assessment/Plan   NEURO:  #L MCA infarct s/p TNK c/b hemorrhagic transformation  Assessment:  - Demonstration of hemorrhagic transformation of L MCA infarct after administration of TNK at 1850 on 7/16  -s/p 10u cryo for reversal prior to transfer.   - ICH score: ICH volume 72.6cc -> 4 points, 97% chance of mortality   - [place] classification: 3c  - s/p 2 pools cryo prior to transfer from OSH   Plan:  - NSU  - Neuro Checks: Q1H  - Sedation: None  - Pain: acetaminophen Q4H  - PT/OT/SLP    - Follow up fibrinogen level, goal > 150  - Follow up CT stability scan ordered in 6 hours   - NSGY consulted  - C/w home gabapentin 300mg qAM, 600mg at bedtime     CARDIOVASCULAR:  #CAD  #HTN   #HLD  Assessment:  - ECHO: 2/2023 LVEF 55-60%   - EKG: NSR,  RBBB  Plan:  - Continue to monitor on telemetry  - BP goal: -150 mmHg (presenting SBP was 150-220 mmHg)    --> PRN: Nicardipine ggt for BP titration  - Hydralazine and Labetalol for SBP > 150  - c/w home atorvastatin 40mg  - Ordered TTE, BNP  - Follow up A1c, lipid panel    RESPIRATORY:  #Acute respiratory insufficiency  Assessment:  - Intubated at OSH for airway protection  - Vent settings: VC FiO2 100%, PEEP 5, RR 16,   Plan:  - Continuous pulse oximetry   - Ventilator bundle while intubated    RENAL/:  #BPH  Assessment:  - Baseline  BUN/Cr: 1  Results from last 72 hours   Lab Units 24  1905   BUN mg/dL 30*   CREATININE mg/dL 1.13       Plan:  - Monitor with daily RFP  - External catheter in place   - C/w home finasteride     FEN/GI:  Plan:  - Monitor and replace electrolytes per protocol  - Diet: NPO   - Bowel Regimen: Docusate-Senna BID    ENDOCRINE:  Assessment:  Results from last 7 days   Lab Units 245 24  1832   POCT GLUCOSE mg/dL  --  101*   GLUCOSE mg/dL 110*  --       Plan:  - Accuchecks & ISS Q4H     HEMATOLOGY:  Assessment:  - Baseline Hgb: 15  - Baseline Plts: 200  Results from last 7 days   Lab Units 24  0104 24  1905   HEMOGLOBIN g/dL 12.4* 12.8*   HEMATOCRIT % 38.8* 40.5*   PLATELETS AUTO x10*3/uL 238 256     Plan:  - Continue to monitor with daily CBC and Coag panel    INFECTIOUS DISEASE:  #Leukocytosis   Assessment:  - Likely 2/2 stroke c/b ICH, no signs of acute infection, afebrile   Results from last 7 days   Lab Units 24  0104 24  1905   WBC AUTO x10*3/uL 24.9* 20.0*    - Temp (24hrs), Av.5 °C (97.7 °F), Min:36.3 °C (97.4 °F), Max:36.6 °C (97.8 °F)     Plan:  - Continue to monitor for s/sx of infection  - Pan culture for temperature > 38.4 C    MUSCULOSKELETAL:  - No acute issues    SKIN:  - No acute issues  - Turns and skin care per NSU protocol    ACCESS:  - A-line, PIV     PROPHYLAXIS:  - DVT Ppx: SCDs  - GI Ppx: Pantoprazole    RESTRAINTS:  Not indicated/Patient does not meet criteria for restraints    Katy Kelly MD  Neurology, PGY-2    PGY-4 Senior Addendum:    This is a 79 year-old male with a medical history as detailed above. In brief, he initially presented to [facility] with NIHSS 11 (LOC-Q 2, LOC-C 1, FP 1, RUE 1, RLE 1, language 3, dysarthria 2), CTH showing minimal R MCA hypodensity. LKW 1700. He was administered 18 mg TNK at 1850. Around 0000 the patient had an exam change, documented as forced L gaze deviation and vomiting. CTH was done at the time and  personally reviewed, showing a large hemorrhagic transformation with IVH. Exam on arrival notable for intact brainstem reflexes, symmetric pupils, only flicker withdrawal in bilateral lower extremities. He was reversed with 2 units of cryoprecipitate prior to arrival. Will plan for a stability scan. Neurosurgery consulted. Rest of plan as above.    David Reynoso, Neurology PGY-4

## 2024-07-17 NOTE — ED PROCEDURE NOTE
Procedure  Intubation    Performed by: Natalia Powell MD  Authorized by: Constantine Magaña MD    Consent:     Consent obtained:  Written and verbal    Consent given by:  Spouse    Risks, benefits, and alternatives were discussed: yes      Risks discussed:  Aspiration, dental trauma, death and hypoxia    Alternatives discussed:  Delayed treatment and observation  Universal protocol:     Procedure explained and questions answered to patient or proxy's satisfaction: yes      Relevant documents present and verified: yes      Patient identity confirmed:  Arm band  Pre-procedure details:     Indications: airway protection      Patient status:  Altered mental status    Look externally: no concerns      Mouth opening - incisor distance:  3 or more finger widths    Hyoid-mental distance: 3 or more finger widths      Hyoid-thyroid distance: 2 or more finger widths      Mallampati score:  I    Obstruction: none      Neck mobility: normal      Pharmacologic strategy: RSI      Induction agents:  Ketamine    Paralytics:  Rocuronium  Procedure details:     Preoxygenation:  Nonrebreather mask    CPR in progress: no      Number of attempts:  3  Successful intubation attempt details:     Intubation method:  Oral    Intubation technique: video assisted      Laryngoscope blade:  Mac 3    Bougie used: yes      Grade view: I      Tube size (mm):  7.0    Tube type:  Cuffed    Tube visualized through cords: yes    First unsuccessful intubation attempt details:     Intubation method:  Oral    Intubation technique:  Video assisted    Laryngoscope blade:  Hypercurved    Bougie used: no      Grade view: I      Tube size (mm):  7.5    Tube type:  Cuffed    Ventilation between 1st and 2nd attempt: yes with mask      Tube visualized through cords: no    Second unsuccessful intubation attempt details:     Intubation method:  Oral    Intubation technique:  Video assisted    Laryngoscope blade:  Hypercurved    Bougie used: no      Grade view:  I      Tube size (mm):  7.0    Tube type:  Cuffed    Ventilation between 2nd and 3rd attempt: yes with mask      Tubes visualized through cords: no    Placement assessment:     Tube secured with:  ETT vo    Breath sounds:  Reduced on right and absent over the epigastrium (Initially placed to 25 cm and breath sounds were decreased over the right so tube was withdrawn to 21 cm prior to x-ray)    Placement verification: chest rise, CXR verification, direct visualization, equal breath sounds, tube exhalation and waveform ETCO2      CXR findings:  High    Tube repositioned: yes    Post-procedure details:     Procedure completion:  Tolerated    Complications: hypoxia    Comments:      Hypoxemia between attempt 1 an attempt 2.  First 2 attempts made by critical care transport personnel.  Third, successful attempt by me.    Family was notified of the tube placement difficulty.               Natalia Powell MD  07/17/24 0194

## 2024-07-17 NOTE — PROGRESS NOTES
Communication Note    Missed Visit: Yes  Missed Visit Reason:  (Pending GOC discussion per RN. Will defer OT at this time.)      07/17/24 at 8:45 AM   Tiffany Crowder OT   Rehab Office: [phone]    '

## 2024-07-18 LAB
ALBUMIN SERPL BCP-MCNC: 3.3 G/DL (ref 3.4–5)
ANION GAP SERPL CALC-SCNC: 14 MMOL/L (ref 10–20)
BASOPHILS # BLD AUTO: 0.02 X10*3/UL (ref 0–0.1)
BASOPHILS NFR BLD AUTO: 0.1 %
BLOOD EXPIRATION DATE: NORMAL
BLOOD EXPIRATION DATE: NORMAL
BUN SERPL-MCNC: 23 MG/DL (ref 6–23)
CA-I BLD-SCNC: 1.12 MMOL/L (ref 1.1–1.33)
CALCIUM SERPL-MCNC: 8.5 MG/DL (ref 8.6–10.6)
CHLORIDE SERPL-SCNC: 110 MMOL/L (ref 98–107)
CO2 SERPL-SCNC: 23 MMOL/L (ref 21–32)
CREAT SERPL-MCNC: 0.95 MG/DL (ref 0.5–1.3)
DISPENSE STATUS: NORMAL
DISPENSE STATUS: NORMAL
EGFRCR SERPLBLD CKD-EPI 2021: 82 ML/MIN/1.73M*2
EOSINOPHIL # BLD AUTO: 0 X10*3/UL (ref 0–0.4)
EOSINOPHIL NFR BLD AUTO: 0 %
ERYTHROCYTE [DISTWIDTH] IN BLOOD BY AUTOMATED COUNT: 14.3 % (ref 11.5–14.5)
GLUCOSE BLD MANUAL STRIP-MCNC: 115 MG/DL (ref 74–99)
GLUCOSE BLD MANUAL STRIP-MCNC: 119 MG/DL (ref 74–99)
GLUCOSE BLD MANUAL STRIP-MCNC: 120 MG/DL (ref 74–99)
GLUCOSE BLD MANUAL STRIP-MCNC: 124 MG/DL (ref 74–99)
GLUCOSE BLD MANUAL STRIP-MCNC: 135 MG/DL (ref 74–99)
GLUCOSE BLD MANUAL STRIP-MCNC: 146 MG/DL (ref 74–99)
GLUCOSE SERPL-MCNC: 142 MG/DL (ref 74–99)
HCT VFR BLD AUTO: 31.9 % (ref 41–52)
HGB BLD-MCNC: 10.7 G/DL (ref 13.5–17.5)
IMM GRANULOCYTES # BLD AUTO: 0.15 X10*3/UL (ref 0–0.5)
IMM GRANULOCYTES NFR BLD AUTO: 0.7 % (ref 0–0.9)
LYMPHOCYTES # BLD AUTO: 1.9 X10*3/UL (ref 0.8–3)
LYMPHOCYTES NFR BLD AUTO: 9.5 %
MAGNESIUM SERPL-MCNC: 1.92 MG/DL (ref 1.6–2.4)
MCH RBC QN AUTO: 27.4 PG (ref 26–34)
MCHC RBC AUTO-ENTMCNC: 33.5 G/DL (ref 32–36)
MCV RBC AUTO: 82 FL (ref 80–100)
MONOCYTES # BLD AUTO: 1.53 X10*3/UL (ref 0.05–0.8)
MONOCYTES NFR BLD AUTO: 7.6 %
MRSA DNA SPEC QL NAA+PROBE: NOT DETECTED
NEUTROPHILS # BLD AUTO: 16.49 X10*3/UL (ref 1.6–5.5)
NEUTROPHILS NFR BLD AUTO: 82.1 %
NRBC BLD-RTO: 0 /100 WBCS (ref 0–0)
PHOSPHATE SERPL-MCNC: 2.5 MG/DL (ref 2.5–4.9)
PLATELET # BLD AUTO: 225 X10*3/UL (ref 150–450)
POTASSIUM SERPL-SCNC: 3.5 MMOL/L (ref 3.5–5.3)
PRODUCT BLOOD TYPE: 5100
PRODUCT BLOOD TYPE: 5100
PRODUCT CODE: NORMAL
PRODUCT CODE: NORMAL
RBC # BLD AUTO: 3.9 X10*6/UL (ref 4.5–5.9)
SODIUM SERPL-SCNC: 143 MMOL/L (ref 136–145)
UNIT ABO: NORMAL
UNIT ABO: NORMAL
UNIT NUMBER: NORMAL
UNIT NUMBER: NORMAL
UNIT RH: NORMAL
UNIT RH: NORMAL
UNIT VOLUME: 77
UNIT VOLUME: 82
WBC # BLD AUTO: 20.1 X10*3/UL (ref 4.4–11.3)

## 2024-07-18 PROCEDURE — 99232 SBSQ HOSP IP/OBS MODERATE 35: CPT | Performed by: PSYCHIATRY & NEUROLOGY

## 2024-07-18 PROCEDURE — 2500000004 HC RX 250 GENERAL PHARMACY W/ HCPCS (ALT 636 FOR OP/ED)

## 2024-07-18 PROCEDURE — 82947 ASSAY GLUCOSE BLOOD QUANT: CPT

## 2024-07-18 PROCEDURE — 2500000001 HC RX 250 WO HCPCS SELF ADMINISTERED DRUGS (ALT 637 FOR MEDICARE OP): Performed by: STUDENT IN AN ORGANIZED HEALTH CARE EDUCATION/TRAINING PROGRAM

## 2024-07-18 PROCEDURE — 2500000001 HC RX 250 WO HCPCS SELF ADMINISTERED DRUGS (ALT 637 FOR MEDICARE OP)

## 2024-07-18 PROCEDURE — 82330 ASSAY OF CALCIUM: CPT

## 2024-07-18 PROCEDURE — 85025 COMPLETE CBC W/AUTO DIFF WBC: CPT

## 2024-07-18 PROCEDURE — 2020000001 HC ICU ROOM DAILY

## 2024-07-18 PROCEDURE — 99291 CRITICAL CARE FIRST HOUR: CPT

## 2024-07-18 PROCEDURE — 2500000005 HC RX 250 GENERAL PHARMACY W/O HCPCS: Performed by: PSYCHIATRY & NEUROLOGY

## 2024-07-18 PROCEDURE — 87641 MR-STAPH DNA AMP PROBE: CPT

## 2024-07-18 PROCEDURE — 2500000004 HC RX 250 GENERAL PHARMACY W/ HCPCS (ALT 636 FOR OP/ED): Performed by: REGISTERED NURSE

## 2024-07-18 PROCEDURE — 87205 SMEAR GRAM STAIN: CPT

## 2024-07-18 PROCEDURE — 94003 VENT MGMT INPAT SUBQ DAY: CPT

## 2024-07-18 PROCEDURE — 2500000004 HC RX 250 GENERAL PHARMACY W/ HCPCS (ALT 636 FOR OP/ED): Performed by: STUDENT IN AN ORGANIZED HEALTH CARE EDUCATION/TRAINING PROGRAM

## 2024-07-18 PROCEDURE — 83735 ASSAY OF MAGNESIUM: CPT

## 2024-07-18 PROCEDURE — 2500000005 HC RX 250 GENERAL PHARMACY W/O HCPCS

## 2024-07-18 PROCEDURE — 84132 ASSAY OF SERUM POTASSIUM: CPT

## 2024-07-18 PROCEDURE — 37799 UNLISTED PX VASCULAR SURGERY: CPT

## 2024-07-18 RX ORDER — FENTANYL CITRATE-0.9 % NACL/PF 10 MCG/ML
25-200 PLASTIC BAG, INJECTION (ML) INTRAVENOUS CONTINUOUS
Status: DISCONTINUED | OUTPATIENT
Start: 2024-07-18 | End: 2024-07-20

## 2024-07-18 RX ORDER — VANCOMYCIN HYDROCHLORIDE 1 G/20ML
INJECTION, POWDER, LYOPHILIZED, FOR SOLUTION INTRAVENOUS DAILY PRN
Status: DISCONTINUED | OUTPATIENT
Start: 2024-07-18 | End: 2024-07-19

## 2024-07-18 RX ORDER — INSULIN LISPRO 100 [IU]/ML
0-5 INJECTION, SOLUTION INTRAVENOUS; SUBCUTANEOUS EVERY 6 HOURS
Status: DISCONTINUED | OUTPATIENT
Start: 2024-07-18 | End: 2024-07-20

## 2024-07-18 RX ORDER — POTASSIUM CHLORIDE 14.9 MG/ML
20 INJECTION INTRAVENOUS ONCE
Status: COMPLETED | OUTPATIENT
Start: 2024-07-18 | End: 2024-07-18

## 2024-07-18 RX ORDER — NICARDIPINE HYDROCHLORIDE 0.2 MG/ML
2.5-15 INJECTION INTRAVENOUS CONTINUOUS
Status: DISCONTINUED | OUTPATIENT
Start: 2024-07-18 | End: 2024-07-20

## 2024-07-18 ASSESSMENT — PAIN - FUNCTIONAL ASSESSMENT: PAIN_FUNCTIONAL_ASSESSMENT: CPOT (CRITICAL CARE PAIN OBSERVATION TOOL)

## 2024-07-18 NOTE — PROGRESS NOTES
Physical Therapy                 Therapy Communication Note    Patient Name: Diego Dumont  MRN: [id number]  Today's Date: 7/18/2024     Discipline: Physical Therapy    Missed Visit Reason: Missed Visit Reason:  (Pt pending further GOC discussion. Plan to hold PT at this time.)    Missed Time: Attempt

## 2024-07-18 NOTE — PROGRESS NOTES
Reviewed with son at bedside re: diagnosis, exam, prognosis, confirmed expected outcome is not within his prestated wishes.   Family all arrived, discussing moving to comfort measures/ hospice.

## 2024-07-18 NOTE — PROGRESS NOTES
"Diego Dumont is a 79 y.o. male on day 1 of admission presenting with Hemorrhagic stroke (Multi) post TNK.    Subjective   NAEON. Patient is intubated       Objective     Last Recorded Vitals  Blood pressure 145/60, pulse 64, temperature 36.4 °C (97.5 °F), resp. rate 16, height 1.727 m (5' 7.99\"), weight 70.8 kg (156 lb 1.4 oz), SpO2 100%.    Physical Exam  Neurological Exam  Relevant Results    NIH Stroke Scale  1A. Level of Consciousness: Requires Repeated Stimulation to Arouse or Responds to Pain  1B. Ask Month and Age: No Questions Right  1C. Blink Eyes & Squeeze Hands: Performs 0 Tasks  2. Best Gaze: Partial Gaze Palsy  3. Visual: No Visual Loss  4. Facial Palsy: Partial Paralysis  5A. Motor - Left Arm: Some Effort Against Gravity  5B. Motor - Right Arm: No Effort Against Gravity  6A. Motor - Left Leg: Some Effort Against Gravity  6B. Motor - Right Leg: Some Effort Against Gravity  7. Limb Ataxia: Absent  8. Sensory Loss: Normal  9. Best Language: Mute, Global Aphasia  10. Dysarthria: Intubated  11. Extinction and Inattention: Visual, Tactile, Auditory, Spatial, or Personal Inattention  NIH Stroke Scale: 22           [place] Coma Scale  Best Eye Response: To pain  Best Verbal Response: None  Best Motor Response: Flexion to pain  [place] Coma Scale Score: 6      Assessment/Plan   This patient currently has cardiac telemetry ordered; if you would like to modify or discontinue the telemetry order, click here to go to the orders activity to modify/discontinue the order.  Principal Problem:    Hemorrhagic stroke (Multi)  Active Problems:    Benign essential HTN    HLD (hyperlipidemia)    Cerebrovascular accident (CVA) due to embolism of left middle cerebral artery (Multi)    Received tissue plasminogen activator (t-PA) less than 24 hours prior to arrival  Diego Dumont 79 y.o. male admitted 7/17/2024 LOS day 1.   LKW 5pm witnessed by family, NIHSS 11 mute, R hemiparesis. CT- no hemorrhage but subtle L frontal " hypodensity, Given TNK with early improvement.  CTA no LVO so plan was to transfer to [facility] hospital for post-TNK care. Developed N, V, repeat CT with major reperfusion hemorrhage, IVH, SAH, with cytotoxic edema and brain compression. Intubated, given cryoprecipitate and transferred to [facility]. On arrival NIHSS 22. Vascular risk factors- HTN< HPL, CAD. CODE STATUS changed to DNR/DNI on 7/17 ongoing goals of care discussion with the family.    Recommendations:  -Stroke workup including TTE, lipid profile, hemoglobin A1c.  -Rest of medical care by the NICU team.    #Left MCA infarct s/p TNK complicated by hemorrhagic transformation   Assessment:  - Neurologically: see above  - 7/16: TNK given at 1850  - CTH demonstrated new hematoma (4x5.8x4.2cm) in left frontal lobe and diffuse SAH in bilateral cerebral hemispheres, cerebellum, and basilar cisterns with mild to moderate IVH  - CTH 7/17 4AM with stable hyperdense intraparenchymal hemorrhage, MLS, hyperdense subarachnoid hemorrhage L>R, IVH; mild increase IVH in lateral ventricles compared to 12AM study  - S/p 10u of cryoprecipitate  - Home medication: gabapentin 300mg every morning, 600mg every night  Plan:  - NSU  - Neuro Checks: Q4H  - Neurosurgery consulted  - DNR, plan to transition to comfort care today   - Gabapentin 300mg every morning, 600mg every night  - Pain: acetaminophen PRN, fent gtt at 50   - PT/OT/SLP     #HTN, HLD  Assessment:  - Home medications: lisinopril 10mg daily, pravastatin 40mg daily  - ECHO: LVEF 65%, moderately dilated IVC  Plan:  - Continue to monitor on telemetry  - Atorvastatin 40mg daily  - BP goal: SBP <160 mmHg (presenting SBP was 150-220 mmHg)    --> PRN: Labetalol and Hydralazine     Pain medications: PRN Tylenol  Fluids: Replete PRN  Electrolytes: Keep mg >2, phos >3  and K >4  Nutrition:  NPO Diet; Effective now   Antimicrobials: Zosyn and Vancomycin  Antiplatelet  None  DVT PPX: SCD's  GI ppx: Esomeprazole  Bowel care:  Senna  Catheter: Bain Catheter  Lines: PIV  Oxygen: ET tube  Drips: Nicardipine    Disposition:   Pending    Code Status: DNR (confirmed on admission)   NOK:  Primary Emergency Contact: Gisela Dumont, Home Phone: [phone]       Lisandra Aguilar MD

## 2024-07-18 NOTE — CARE PLAN
The patient's goals for the shift include  sergei    The clinical goals for the shift include remain hemodynamically stable    Over the shift, the patient did not make progress toward the following goals. Barriers to progression include sergei. Recommendations to address these barriers include sergei.

## 2024-07-18 NOTE — PROGRESS NOTES
"Diego Dumont is a 79 y.o. male on day 1 of admission presenting with Hemorrhagic stroke (Multi).    Subjective   No events overnight    Objective     Physical Exam  Intubated  EOV  +c/g/c  L spontaneous antigravity  RUE flaccid  RLE withdraw antigravity     Last Recorded Vitals  Blood pressure 145/60, pulse 64, temperature 36.6 °C (97.9 °F), temperature source Temporal, resp. rate 16, height 1.727 m (5' 7.99\"), weight 70.8 kg (156 lb 1.4 oz), SpO2 100%.  Intake/Output last 3 Shifts:  I/O last 3 completed shifts:  In: 2849.5 (40.2 mL/kg) [I.V.:2849.5 (40.2 mL/kg)]  Out: 3820 (54 mL/kg) [Urine:3820 (1.5 mL/kg/hr)]  Weight: 70.8 kg     Relevant Results      Assessment/Plan   Principal Problem:    Hemorrhagic stroke (Multi)  Active Problems:    Benign essential HTN    HLD (hyperlipidemia)    Cerebrovascular accident (CVA) due to embolism of left middle cerebral artery (Multi)    Received tissue plasminogen activator (t-PA) less than 24 hours prior to arrival    Diego Dumont is a 79 yr old M w h/o HTN, HLD, CAD, POA, BPH, p/w acute aphasia, subacute R arm weakness, LNK 7/16 1700, CTH L <CA infacrt, CTA no Lvo, s/p TNK, decline in neuro exam, CTH hemorrhagic conversion of stroke, s/p intubation, s/p 10u cryo, rCTH  stable, 7/17 rrCTH incr IVH, stable MLS and hemorrhage, 7/17 code status changed to DNR    NSU  Neurology primary  GOC pending with primary team       Dipesh Benson MD      "

## 2024-07-18 NOTE — PROGRESS NOTES
[facility]  NEUROSCIENCE INTENSIVE CARE UNIT  DAILY PROGRESS NOTE       Patient Name: Diego Dumont   MRN: [id number]     Admit Date: 2024     : 1945 AGE: 79 y.o. GENDER: male        Subjective    79 year-old male with past medical history of hypertension, hyperlipidemia, CAD, OA, and BPH who presented as OSH transfer for management of hemorrhagic transformation of left MCA infarct. Patient presented to OSH ED as stroke code, with new onset aphasia. LKW  1700. Initial NIHSS 11. CTH demonstrated left MCA infarct involving insular cortex and frontal operculum. TNK given at  1850. Neurologic exam changed around midnight with new left gaze preference and vomiting. NIHSS 26. CTH demonstrated new hematoma in left frontal lobe and diffuse SAH in bilateral cerebral hemispheres, cerebellum, and basilar cisterns with mild to moderate IVH. Patient was intubated, given 10u of cryoprecipitate, and started on nicardipine infusion at OSH prior to transfer to [facility].     Interval Events: NAEON. Plan to extubate today per family wishes.     Objective   VITALS (24H):  Temp:  [36.2 °C (97.2 °F)-37.2 °C (99 °F)] 36.4 °C (97.5 °F)  Heart Rate:  [57-95] 64  Resp:  [10-20] 16  BP: (106-174)/(54-90) 145/60  Arterial Line BP 1: (123-192)/(42-74) 192/74  FiO2 (%):  [50 %-100 %] 50 %  INTAKE/OUTPUT:  Intake/Output Summary (Last 24 hours) at 2024 0650  Last data filed at 2024 0604  Gross per 24 hour   Intake 2803.66 ml   Output 2270 ml   Net 533.66 ml     VENT SETTINGS:  Vent Mode: Volume control/assist control  FiO2 (%):  [50 %-100 %] 50 %  S RR:  [16] 16  S VT:  [450 mL] 450 mL  PEEP/CPAP (cm H2O):  [5 cm H20] 5 cm H20  MAP (cm H2O):  [7.2-9] 8.1     PHYSICAL EXAM:  NEURO:  NEURO:  - Eyes open to sternal rub, not following commands, midline gaze, 2mm/reactive bilaterally  - Left corneal present, right corneal present but less brisk compared to L., positive gag & cough  - BLE triple flexion to  nox stim, RUE flaccid, LUE spontaneous  CV:  - RRR on telemetry, NSR 60s  - Left radial arterial line in place  RESP:  - Regular, unlabored  - Oxygen: ventilator VC FiO2 50%, PEEP 5, RR 16,   :  - Indwelling catheter in place  GI:  - Abdomen NT/ND, soft  SKIN:  - Intact    MEDICATIONS:  Scheduled: PRN: Continuous:   atorvastatin, 40 mg, nasogastric tube, Nightly  esomeprazole, 10 mg, nasogastric tube, Daily before breakfast  finasteride, 5 mg, oral, Daily  gabapentin, 300 mg, nasogastric tube, q AM  gabapentin, 600 mg, nasogastric tube, Nightly  insulin lispro, 0-5 Units, subcutaneous, q6h  lidocaine, 1 Application, Topical, Once  oxygen, , inhalation, Continuous - Inhalation  oxymetazoline, 2 spray, Each Nostril, Once  perflutren protein A microsphere, 0.5 mL, intravenous, Once in imaging  sennosides-docusate sodium, 2 tablet, nasogastric tube, BID  sulfur hexafluoride microsphr, 2 mL, intravenous, Once in imaging     PRN medications: acetaminophen **OR** acetaminophen **OR** acetaminophen, acetaminophen, dextrose, dextrose, glucagon, glucagon, hydrALAZINE **FOLLOWED BY** [START ON 7/19/2024] hydrALAZINE, HYDROmorphone, labetaloL, oxygen fentaNYL,  mcg/hr, Last Rate: 50 mcg/hr (07/18/24 0604)  niCARdipine, 2.5-15 mg/hr, Last Rate: Stopped (07/17/24 1113)  sodium chloride 0.9%, 100 mL/hr, Last Rate: 100 mL/hr (07/18/24 0604)         LAB RESULTS:  Results from last 72 hours   Lab Units 07/18/24  0032 07/17/24  0306   WBC AUTO x10*3/uL 20.1* 21.5*   NRBC AUTO /100 WBCs 0.0 0.0   RBC AUTO x10*6/uL 3.90* 3.98*   HEMOGLOBIN g/dL 10.7* 11.1*   HEMATOCRIT % 31.9* 32.8*   MCV fL 82 82   MCH pg 27.4 27.9   MCHC g/dL 33.5 33.8   RDW % 14.3 14.1   PLATELETS AUTO x10*3/uL 225 233   NEUTROS PCT AUTO % 82.1 88.8   IG PCT AUTO % 0.7 0.7   LYMPHS PCT AUTO % 9.5 4.1   MONOS PCT AUTO % 7.6 6.3   EOS PCT AUTO % 0.0 0.0   BASOS PCT AUTO % 0.1 0.1   NEUTROS ABS x10*3/uL 16.49* 19.13*   IG AUTO x10*3/uL 0.15 0.15   LYMPHS  ABS AUTO x10*3/uL 1.90 0.88   MONOS ABS AUTO x10*3/uL 1.53* 1.35*   EOS ABS AUTO x10*3/uL 0.00 0.00   BASOS ABS AUTO x10*3/uL 0.02 0.03       Results from last 72 hours   Lab Units 07/18/24  0030 07/18/24  0029 07/17/24  0306   GLUCOSE mg/dL 142*  --  182*   SODIUM mmol/L 143  --  138   POTASSIUM mmol/L 3.5  --  3.7   CHLORIDE mmol/L 110*  --  105   CO2 mmol/L 23  --  22   ANION GAP mmol/L 14  --  15   BUN mg/dL 23  --  26*   CREATININE mg/dL 0.95  --  1.03   EGFR mL/min/1.73m*2 82  --  74   CALCIUM mg/dL 8.5*  --  8.6   PHOSPHORUS mg/dL 2.5  --  3.6   ALBUMIN g/dL 3.3*  --  3.6   MAGNESIUM mg/dL 1.92  --  1.94   POCT CALCIUM IONIZED (MMOL/L) IN BLOOD mmol/L  --  1.12 1.11      IMAGING RESULTS:  CT head wo IV contrast   Final Result   Worsening interventricular hemorrhage.   Stable appearing midline shift.   On coronal imaging there is concern for expansion of the   intraparenchymal hemorrhage in the [place] insular cortex.        I personally reviewed the images/study and I agree with the findings   as stated. This study was interpreted at [place], [place].        MACRO:   None        Signed by: elmer lopez 7/17/2024 12:33 PM   Dictation workstation:   [id number]      Transthoracic Echo (TTE) Complete   Final Result      CT head wo IV contrast   Final Result   There is again evidence of a large area of irregular hyperdense   intraparenchymal hemorrhage within the lateral left frontal lobe   involving the left frontal lobe operculum and extending caudally into   the left subinsular region similar in appearance when compared with   the prior study dated 07/17/2024. There is surrounding hypodensity   involving cortex underlying white matter compatible with surrounding   edema with a prior CT of the head dated 07/16/2024 demonstrating   subtle asymmetric hypodensity involving cortex and underlying white   matter within left frontal lobe and left insula compatible with an    evolving area of acute infarction suggesting the intraparenchymal   hemorrhage may represent hemorrhagic transformation of the evolving   infarction. There is again evidence of mass effect within the left   cerebral hemisphere contributing to partial effacement of the left   lateral ventricle with mild bowing of the midline structures from   left to right.        There is again evidence of extensive hyperdense subarachnoid   hemorrhage along sulci of the cerebral hemispheres bilaterally left   greater than right similar when compared with the prior study.        There is again evidence of hyperdense intraventricular hemorrhage   best appreciated within the lateral ventricles left greater than   right. There is mild interval increased intraventricular hemorrhage   within the lateral ventricles when compared with the prior study   dated 07/17/2024 time 12:23 a.m.. There is again evidence of   hyperdense intraventricular hemorrhage within the 4th ventricle   slightly less conspicuous on the current study although the posterior   fossa is suboptimally evaluated secondary to metallic artifact from   the patient's dental hardware.        The ventricular system is similar in size and configuration without   evidence of magaly hydrocephalus.        MACRO:   None.        Signed by: Robin Gonzalez 7/17/2024 5:08 AM   Dictation workstation:   [id number]      XR chest 1 view   Final Result   1.  Persistent right upper lobe collapse and consolidation with right   hemidiaphragmatic elevation.   2. Slightly improved rightward deviation of the trachea, with   endotracheal tube positioned 3.2 cm above the dina.        I personally reviewed the images/study and I agree with the findings   as stated by Hipolito Erazo MD (Radiology Resident).   This study was interpreted at [facility], [place], [place].        MACRO:   None        Signed by: Duarte Kwan 7/17/2024 9:50 AM    Dictation workstation:   [id number]            Assessment/Plan    NEURO:  #Left MCA infarct s/p TNK complicated by hemorrhagic transformation   Assessment:  - Neurologically: see above  - 7/16: TNK given at 1850  - CTH demonstrated new hematoma (4x5.8x4.2cm) in left frontal lobe and diffuse SAH in bilateral cerebral hemispheres, cerebellum, and basilar cisterns with mild to moderate IVH  - CTH 7/17 4AM with stable hyperdense intraparenchymal hemorrhage, MLS, hyperdense subarachnoid hemorrhage L>R, IVH; mild increase IVH in lateral ventricles compared to 12AM study  - S/p 10u of cryoprecipitate  - Home medication: gabapentin 300mg every morning, 600mg every night  Plan:  - NSU  - Neuro Checks: Q4H  - Neurosurgery consulted  - DNR, plan to transition to comfort care today   - Gabapentin 300mg every morning, 600mg every night  - Pain: acetaminophen PRN, fent gtt at 50   - PT/OT/SLP    CARDIOVASCULAR:  #HTN, HLD  Assessment:  - Home medications: lisinopril 10mg daily, pravastatin 40mg daily  - ECHO: LVEF 65%, moderately dilated IVC  Plan:  - Continue to monitor on telemetry  - Atorvastatin 40mg daily  - BP goal: SBP <160 mmHg (presenting SBP was 150-220 mmHg)    --> PRN: Labetalol and Hydralazine   - Off Cardene gtt     RESPIRATORY:  #Acute respiratory insufficiency  Assessment:  - Intubated at OSH  - Vent settings: VC FiO2 100%, PEEP 5, RR 16,   Plan:  - Continuous pulse oximetry   - O2 PRN to maintain SpO2 > 94%, wean as tolerated  - Ventilator bundle while intubated  - GOC at 6PM    RENAL/:  #BPH  Assessment:  - Home medications: finasteride 5mg daily, tamsulosin 0.4mg daily   Results from last 72 hours   Lab Units 07/18/24  0030 07/17/24  0306   BUN mg/dL 23 26*   CREATININE mg/dL 0.95 1.03       Plan:  - Monitor with daily RFP  - Finasteride 5mg daily  - Maintain indwelling catheter for: critically ill patient who need accurate urinary output measurements    FEN/GI:  #No active issues  Assessment:  -  Last BM: PTA  Plan:  - Monitor and replace electrolytes per protocol  - IVF: NS @ 100 mL/hr  - Diet: NPO   - Bowel regimen: Modesta-Colace    ENDOCRINE:  #Hyperglycemia  Assessment:  Results from last 7 days   Lab Units 24  0544 24  0038 24  0030 24  1633 24  1203 24  0810 24  0326 24  0306   POCT GLUCOSE mg/dL 124* 135*  --  146* 162* 146* 184*  --    GLUCOSE mg/dL  --   --  142*  --   --   --   --  182*      Plan:  - Accuchecks & ISS Q4H while NPO    HEMATOLOGY:  #Anemia, most likely dilutional and iso hemorrhagic bleed  Assessment:  Results from last 7 days   Lab Units 24  0032 24  0306   HEMOGLOBIN g/dL 10.7* 11.1*   HEMATOCRIT % 31.9* 32.8*   PLATELETS AUTO x10*3/uL 225 233     Plan:  - Continue to monitor with daily CBC and Coag panel    INFECTIOUS DISEASE:  #Leukocytosis  Assessment:  Results from last 7 days   Lab Units 24  0032 24  0306   WBC AUTO x10*3/uL 20.1* 21.5*    - Temp (24hrs), Av.8 °C (98.2 °F), Min:36.2 °C (97.2 °F), Max:37.2 °C (99 °F)  - Leukocytosis trending down. Remains afebrile and hemodynamically stable. Monitor off abx.   Plan:  - Continue to monitor for s/sx of infection  - Pan culture for temperature > 38.4 C    MUSCULOSKELETAL:  - No acute issues    SKIN:  - No acute issues  - Turns and skin care per NSU protocol    ACCESS:  - PIVs  - Left radial arterial line    PROPHYLAXIS:  - DVT Ppx: SCDs  - GI Ppx: Esomeprazole     RESTRAINTS:  I agree with nursing assessment of the patient's need for restraints to protect the patient from injury and facilitate healing. The patient is unable to cooperate with the plan of care and at risk for disrupting critical therapy (i.e., removing medical devices, lines, tubes and/or dressings).  Please see order for specifics. Restraints can be removed when the patient is able to cooperate with plan of care and allow healing to occur, or the medical devices at risk are discontinued by  the medical team.     VIDA Fuentes-CNP  Neuroscience Intensive Care     Total critical care time of 60 minutes, with > 50% of time spent in direct contact with patient/family for education, counseling and coordination of care.

## 2024-07-18 NOTE — PROGRESS NOTES
Occupational Therapy                 Therapy Communication Note    Patient Name: Diego Dumont  MRN: [id number]  Today's Date: 7/18/2024     Discipline: Occupational Therapy    Missed Visit Reason: Missed Visit Reason: Patient placed on medical hold (Per MD, hold OT deisi, pt is still pending [facility] and not appropriate for therapy at this time.)    Missed Time: Attempt    Luz Marina Ptael OT  7/18/2024

## 2024-07-18 NOTE — CARE PLAN
Problem: Skin  Goal: Prevent/manage excess moisture  Outcome: Progressing  Goal: Prevent/minimize sheer/friction injuries  Outcome: Progressing  Goal: Promote/optimize nutrition  Outcome: Progressing  Goal: Promote skin healing  Outcome: Progressing     Problem: General Stroke  Goal: Establish a mutual long term goal with patient by discharge  Outcome: Progressing  Goal: Maintain BP within ordered limits throughout shift  Outcome: Progressing  Goal: Participate in treatment (ie., meds, therapy) throughout shift  Outcome: Progressing  Goal: No symptoms of aspiration throughout shift  Outcome: Progressing  Goal: No symptoms of hemorrhage throughout shift  Outcome: Progressing     Problem: ICU Stroke  Goal: Maintain ICP within ordered limits throughout shift  Outcome: Progressing  Goal: Tolerate ventilator weaning trial during shift  Outcome: Progressing  Goal: Maintain patent airway throughout shift  Outcome: Progressing     Problem: Safety - Medical Restraint  Goal: Remains free of injury from restraints (Restraint for Interference with Medical Device)  Outcome: Progressing  Goal: Free from restraint(s) (Restraint for Interference with Medical Device)  Outcome: Progressing     Problem: Fall/Injury  Goal: Not fall by end of shift  Outcome: Progressing  Goal: Be free from injury by end of the shift  Outcome: Progressing     Problem: Knowledge Deficit  Goal: Patient/family/caregiver demonstrates understanding of disease process, treatment plan, medications, and discharge instructions  Outcome: Progressing     Problem: Mechanical Ventilation  Goal: Patient Will Maintain Patent Airway  Outcome: Progressing  Goal: Oral health is maintained or improved  Outcome: Progressing  Goal: ET tube will be managed safely  Outcome: Progressing     Problem: Pain - Adult  Goal: Verbalizes/displays adequate comfort level or baseline comfort level  Outcome: Progressing     Problem: Safety - Adult  Goal: Free from fall injury  Outcome:  Progressing

## 2024-07-18 NOTE — CONSULTS
"Vancomycin Dosing by Pharmacy  INITIAL CONSULT      Diego Dumont is a 79 y.o. male who Pharmacy is consulted to dose vancomycin for pneumonia.     Based on the patient's indication and renal status, this patient will be dosed based on a goal vancomycin AUC of 400-600.     Renal function is currently improving.    Estimated Creatinine Clearance: 62 mL/min (by C-G formula based on SCr of 0.95 mg/dL).    No results found for: \"VANCORANDOM\", \"VANCOTROUGH\"    Results from last 7 days   Lab Units 07/18/24  0032 07/18/24  0030 07/17/24  0306 07/17/24  0104 07/16/24  1905   CREATININE mg/dL  --  0.95 1.03  --  1.13   BUN mg/dL  --  23 26*  --  30*   WBC AUTO x10*3/uL 20.1*  --  21.5* 24.9* 20.0*        Visit Vitals  /73   Pulse 62   Temp 36.7 °C (98.1 °F) (Temporal)   Resp (!) 8       Urine Culture   Date/Time Value Ref Range Status   06/19/2024 01:24 PM >100,000 Enterococcus faecalis (A)  Final        Assessment/Plan     Will order maintenance dose of 1250 mg every 24 hours. This dosing regimen is predicted by [employer] to result in the following pharmacokinetic parameters:   Regimen: 1250 mg IV every 24 hours.  Start time: 15:02 on 07/18/2024  Exposure target: AUC24 (range)400-600 mg/L.hr   AUC24,ss: 425 mg/L.hr  Probability of AUC24 > 400: 58 %  Ctrough,ss: 12 mg/L  Probability of Ctrough,ss > 20: 8 %  Probability of nephrotoxicity (Lodise ISAÍAS 2009): 7 %      Vancomycin follow-up level will be ordered for 7/20 at am labs  , unless clinically indicated sooner.  Will continue to monitor renal function daily while on vancomycin and order serum creatinine at least every 48 hours if not already ordered.  Will follow for continued vancomycin needs, clinical response, and signs/symptoms of toxicity.       Leola Liu, PharmD  "

## 2024-07-18 NOTE — PROGRESS NOTES
Social Work Discharge Planning note:    -Patient discussed during interdisciplinary rounds this morning.   -Team members present: Fellow, PT and OT, and SW  -Plan per medical team: GOC discussion today  -Payer: Medicare  -Status: Inpatient  -Discharge disposition: SW met with Pt (not oriented) and family (wife and sons/medical POA's, Nilson and Moe) at their request to discuss hospice options and providers. After further discussing hospice levels of care and providers, family gave [facility] ([facility] as provider preference. Family then reported that that they have decided to pursue transition to comfort care and requested to speak with medical team. Medical team was updated and will meet with family. A referral was subsequently made to [facility], and they will contact family to set up a meeting,  likely for tomorrow. SW will continue to follow for emotional/incidental support to Pt/family.    -Anticipated Date of Discharge:  7/20/24    RENEA Hansen, LSW

## 2024-07-19 ENCOUNTER — APPOINTMENT (OUTPATIENT)
Dept: RADIOLOGY | Facility: HOSPITAL | Age: 79
End: 2024-07-19
Payer: MEDICARE

## 2024-07-19 LAB
ALBUMIN SERPL BCP-MCNC: 3.1 G/DL (ref 3.4–5)
ANION GAP SERPL CALC-SCNC: 11 MMOL/L (ref 10–20)
BASOPHILS # BLD AUTO: 0.04 X10*3/UL (ref 0–0.1)
BASOPHILS # BLD AUTO: 0.06 X10*3/UL (ref 0–0.1)
BASOPHILS NFR BLD AUTO: 0.2 %
BASOPHILS NFR BLD AUTO: 0.3 %
BUN SERPL-MCNC: 24 MG/DL (ref 6–23)
CA-I BLD-SCNC: 1.16 MMOL/L (ref 1.1–1.33)
CALCIUM SERPL-MCNC: 8.3 MG/DL (ref 8.6–10.6)
CARDIAC TROPONIN I PNL SERPL HS: 15 NG/L (ref 0–53)
CFT FORM KAOLIN IND BLD RES TEG: NORMAL MIN
CHLORIDE SERPL-SCNC: 113 MMOL/L (ref 98–107)
CLOT ANGLE.KAOLIN INDUCED BLD RES TEG: NORMAL DEG
CLOT INIT KAO IND P HEP NEUT BLD RES TEG: NORMAL MIN
CLOT INIT KAO IND P HEP NEUT BLD RES TEG: NORMAL MIN
CO2 SERPL-SCNC: 24 MMOL/L (ref 21–32)
CREAT SERPL-MCNC: 1.04 MG/DL (ref 0.5–1.3)
EGFRCR SERPLBLD CKD-EPI 2021: 73 ML/MIN/1.73M*2
EOSINOPHIL # BLD AUTO: 0 X10*3/UL (ref 0–0.4)
EOSINOPHIL # BLD AUTO: 0.02 X10*3/UL (ref 0–0.4)
EOSINOPHIL NFR BLD AUTO: 0 %
EOSINOPHIL NFR BLD AUTO: 0.1 %
ERYTHROCYTE [DISTWIDTH] IN BLOOD BY AUTOMATED COUNT: 14.5 % (ref 11.5–14.5)
ERYTHROCYTE [DISTWIDTH] IN BLOOD BY AUTOMATED COUNT: 14.6 % (ref 11.5–14.5)
FIBRINOGEN BLD CALC-MCNC: NORMAL MG/DL
GLUCOSE BLD MANUAL STRIP-MCNC: 109 MG/DL (ref 74–99)
GLUCOSE BLD MANUAL STRIP-MCNC: 118 MG/DL (ref 74–99)
GLUCOSE BLD MANUAL STRIP-MCNC: 125 MG/DL (ref 74–99)
GLUCOSE BLD MANUAL STRIP-MCNC: 128 MG/DL (ref 74–99)
GLUCOSE SERPL-MCNC: 112 MG/DL (ref 74–99)
HCT VFR BLD AUTO: 32.4 % (ref 41–52)
HCT VFR BLD AUTO: 34.1 % (ref 41–52)
HGB BLD-MCNC: 10.5 G/DL (ref 13.5–17.5)
HGB BLD-MCNC: 11.2 G/DL (ref 13.5–17.5)
HOLD SPECIMEN: NORMAL
HOLD SPECIMEN: NORMAL
IMM GRANULOCYTES # BLD AUTO: 0.09 X10*3/UL (ref 0–0.5)
IMM GRANULOCYTES # BLD AUTO: 0.34 X10*3/UL (ref 0–0.5)
IMM GRANULOCYTES NFR BLD AUTO: 0.5 % (ref 0–0.9)
IMM GRANULOCYTES NFR BLD AUTO: 1.4 % (ref 0–0.9)
LYMPHOCYTES # BLD AUTO: 1.89 X10*3/UL (ref 0.8–3)
LYMPHOCYTES # BLD AUTO: 3.67 X10*3/UL (ref 0.8–3)
LYMPHOCYTES NFR BLD AUTO: 10.4 %
LYMPHOCYTES NFR BLD AUTO: 15.5 %
MA KAOLIN BLD RES TEG: NORMAL MM
MA KAOLIN+TF BLD RES TEG: NORMAL MM
MA TF IND+IIB-IIIA INH BLD RES TEG: NORMAL MM
MAGNESIUM SERPL-MCNC: 2.11 MG/DL (ref 1.6–2.4)
MCH RBC QN AUTO: 27.1 PG (ref 26–34)
MCH RBC QN AUTO: 27.6 PG (ref 26–34)
MCHC RBC AUTO-ENTMCNC: 32.4 G/DL (ref 32–36)
MCHC RBC AUTO-ENTMCNC: 32.8 G/DL (ref 32–36)
MCV RBC AUTO: 82 FL (ref 80–100)
MCV RBC AUTO: 85 FL (ref 80–100)
MONOCYTES # BLD AUTO: 1.37 X10*3/UL (ref 0.05–0.8)
MONOCYTES # BLD AUTO: 1.49 X10*3/UL (ref 0.05–0.8)
MONOCYTES NFR BLD AUTO: 6.3 %
MONOCYTES NFR BLD AUTO: 7.5 %
NEUTROPHILS # BLD AUTO: 14.85 X10*3/UL (ref 1.6–5.5)
NEUTROPHILS # BLD AUTO: 18.1 X10*3/UL (ref 1.6–5.5)
NEUTROPHILS NFR BLD AUTO: 76.4 %
NEUTROPHILS NFR BLD AUTO: 81.4 %
NRBC BLD-RTO: 0 /100 WBCS (ref 0–0)
NRBC BLD-RTO: 0 /100 WBCS (ref 0–0)
PHOSPHATE SERPL-MCNC: 2.5 MG/DL (ref 2.5–4.9)
PLATELET # BLD AUTO: 235 X10*3/UL (ref 150–450)
PLATELET # BLD AUTO: 251 X10*3/UL (ref 150–450)
POTASSIUM SERPL-SCNC: 3.9 MMOL/L (ref 3.5–5.3)
RBC # BLD AUTO: 3.8 X10*6/UL (ref 4.5–5.9)
RBC # BLD AUTO: 4.14 X10*6/UL (ref 4.5–5.9)
SODIUM SERPL-SCNC: 144 MMOL/L (ref 136–145)
WBC # BLD AUTO: 18.2 X10*3/UL (ref 4.4–11.3)
WBC # BLD AUTO: 23.7 X10*3/UL (ref 4.4–11.3)

## 2024-07-19 PROCEDURE — C9113 INJ PANTOPRAZOLE SODIUM, VIA: HCPCS

## 2024-07-19 PROCEDURE — 70551 MRI BRAIN STEM W/O DYE: CPT | Performed by: RADIOLOGY

## 2024-07-19 PROCEDURE — 2020000001 HC ICU ROOM DAILY

## 2024-07-19 PROCEDURE — 37799 UNLISTED PX VASCULAR SURGERY: CPT

## 2024-07-19 PROCEDURE — 99232 SBSQ HOSP IP/OBS MODERATE 35: CPT

## 2024-07-19 PROCEDURE — 2500000001 HC RX 250 WO HCPCS SELF ADMINISTERED DRUGS (ALT 637 FOR MEDICARE OP): Performed by: STUDENT IN AN ORGANIZED HEALTH CARE EDUCATION/TRAINING PROGRAM

## 2024-07-19 PROCEDURE — 85025 COMPLETE CBC W/AUTO DIFF WBC: CPT

## 2024-07-19 PROCEDURE — 85384 FIBRINOGEN ACTIVITY: CPT

## 2024-07-19 PROCEDURE — 2500000005 HC RX 250 GENERAL PHARMACY W/O HCPCS: Performed by: STUDENT IN AN ORGANIZED HEALTH CARE EDUCATION/TRAINING PROGRAM

## 2024-07-19 PROCEDURE — 82330 ASSAY OF CALCIUM: CPT

## 2024-07-19 PROCEDURE — 94799 UNLISTED PULMONARY SVC/PX: CPT

## 2024-07-19 PROCEDURE — 94003 VENT MGMT INPAT SUBQ DAY: CPT

## 2024-07-19 PROCEDURE — 84484 ASSAY OF TROPONIN QUANT: CPT

## 2024-07-19 PROCEDURE — 82947 ASSAY GLUCOSE BLOOD QUANT: CPT

## 2024-07-19 PROCEDURE — 2500000004 HC RX 250 GENERAL PHARMACY W/ HCPCS (ALT 636 FOR OP/ED)

## 2024-07-19 PROCEDURE — 70551 MRI BRAIN STEM W/O DYE: CPT

## 2024-07-19 PROCEDURE — 83735 ASSAY OF MAGNESIUM: CPT

## 2024-07-19 PROCEDURE — 2500000001 HC RX 250 WO HCPCS SELF ADMINISTERED DRUGS (ALT 637 FOR MEDICARE OP)

## 2024-07-19 PROCEDURE — 2500000004 HC RX 250 GENERAL PHARMACY W/ HCPCS (ALT 636 FOR OP/ED): Performed by: STUDENT IN AN ORGANIZED HEALTH CARE EDUCATION/TRAINING PROGRAM

## 2024-07-19 PROCEDURE — 2500000004 HC RX 250 GENERAL PHARMACY W/ HCPCS (ALT 636 FOR OP/ED): Performed by: REGISTERED NURSE

## 2024-07-19 PROCEDURE — 80069 RENAL FUNCTION PANEL: CPT

## 2024-07-19 PROCEDURE — 99291 CRITICAL CARE FIRST HOUR: CPT | Performed by: NEUROLOGICAL SURGERY

## 2024-07-19 RX ORDER — SODIUM CHLORIDE, SODIUM LACTATE, POTASSIUM CHLORIDE, CALCIUM CHLORIDE 600; 310; 30; 20 MG/100ML; MG/100ML; MG/100ML; MG/100ML
100 INJECTION, SOLUTION INTRAVENOUS CONTINUOUS
Status: DISCONTINUED | OUTPATIENT
Start: 2024-07-19 | End: 2024-07-19

## 2024-07-19 RX ORDER — DEXTROSE, SODIUM CHLORIDE, SODIUM LACTATE, POTASSIUM CHLORIDE, AND CALCIUM CHLORIDE 5; .6; .31; .03; .02 G/100ML; G/100ML; G/100ML; G/100ML; G/100ML
100 INJECTION, SOLUTION INTRAVENOUS CONTINUOUS
Status: DISCONTINUED | OUTPATIENT
Start: 2024-07-19 | End: 2024-07-19

## 2024-07-19 RX ORDER — HYDRALAZINE HYDROCHLORIDE 20 MG/ML
INJECTION INTRAMUSCULAR; INTRAVENOUS
Status: COMPLETED
Start: 2024-07-19 | End: 2024-07-19

## 2024-07-19 RX ORDER — PANTOPRAZOLE SODIUM 40 MG/10ML
40 INJECTION, POWDER, LYOPHILIZED, FOR SOLUTION INTRAVENOUS DAILY
Status: DISCONTINUED | OUTPATIENT
Start: 2024-07-19 | End: 2024-07-20

## 2024-07-19 RX ORDER — HYDRALAZINE HYDROCHLORIDE 20 MG/ML
10 INJECTION INTRAMUSCULAR; INTRAVENOUS
Status: DISCONTINUED | OUTPATIENT
Start: 2024-07-19 | End: 2024-07-20

## 2024-07-19 RX ORDER — HEPARIN SODIUM 5000 [USP'U]/ML
5000 INJECTION, SOLUTION INTRAVENOUS; SUBCUTANEOUS EVERY 8 HOURS
Status: DISCONTINUED | OUTPATIENT
Start: 2024-07-19 | End: 2024-07-20

## 2024-07-19 NOTE — PROGRESS NOTES
Vancomycin Dosing by Pharmacy- Cessation of Therapy    Consult to pharmacy for vancomycin dosing has been discontinued by the prescriber, pharmacy will sign off at this time.    Please call pharmacy if there are further questions or re-enter a consult if vancomycin is resumed.     Leticia Contreras, PharmD

## 2024-07-19 NOTE — PROGRESS NOTES
"Diego Dumont is a 79 y.o. male on day 2 of admission presenting with Hemorrhagic stroke (Multi) post TNK.    Subjective   NAEON. Patient is intubated on mechanical ventilation       Objective     Last Recorded Vitals  Blood pressure 170/70, pulse 74, temperature 36.6 °C (97.9 °F), resp. rate 16, height 1.727 m (5' 7.99\"), weight 66.5 kg (146 lb 9.7 oz), SpO2 97%.    Physical Exam  Neurological Exam  Relevant Results    NIH Stroke Scale  1A. Level of Consciousness: Requires Repeated Stimulation to Arouse or Responds to Pain  1B. Ask Month and Age: No Questions Right  1C. Blink Eyes & Squeeze Hands: Performs 0 Tasks  2. Best Gaze: Normal  3. Visual: No Visual Loss  4. Facial Palsy: Normal Symmetrical Movements  5A. Motor - Left Arm: Some Effort Against Gravity  5B. Motor - Right Arm: No Movement  6A. Motor - Left Leg: No Effort Against Gravity  6B. Motor - Right Leg: No Effort Against [place]  7. Limb Ataxia: Absent  8. Sensory Loss: Mild-to-Moderate Sensory Loss  9. Best Language: No Aphasia  10. Dysarthria: Intubated  11. Extinction and Inattention: Profound Jai-Inattention or Extinction to More than One Modality  NIH Stroke Scale: 21           [place] Coma Scale  Best Eye Response: To pain  Best Verbal Response: None  Best Motor Response: Withdraws to pain  [place] Coma Scale Score: 7      Assessment/Plan   This patient currently has cardiac telemetry ordered; if you would like to modify or discontinue the telemetry order, click here to go to the orders activity to modify/discontinue the order.  Principal Problem:    Hemorrhagic stroke (Multi)  Active Problems:    Benign essential HTN    HLD (hyperlipidemia)    Cerebrovascular accident (CVA) due to embolism of left middle cerebral artery (Multi)    Received tissue plasminogen activator (t-PA) less than 24 hours prior to arrival  Diego Dumont 79 y.o. male admitted 7/17/2024 LOS day 1.   LKW 5pm witnessed by family, NIHSS 11 mute, R hemiparesis. CT- no " hemorrhage but subtle L frontal hypodensity, Given TNK with early improvement.  CTA no LVO so plan was to transfer to [facility] hospital for post-TNK care. Developed N, V, repeat CT with major reperfusion hemorrhage, IVH, SAH, with cytotoxic edema and brain compression. Intubated, given cryoprecipitate and transferred to [facility]. On arrival NIHSS 22. Vascular risk factors- HTN< HPL, CAD. CODE STATUS changed to DNR/DNI on 7/17 ongoing goals of care discussion with the family.    Updates  -Ongoing [facility] discussion with family.  -Rest of medical care by the NICU team.    #Left MCA infarct s/p TNK complicated by hemorrhagic transformation   Assessment:  - Neurologically: see above  - 7/16: TNK given at 1850  - CTH demonstrated new hematoma (4x5.8x4.2cm) in left frontal lobe and diffuse SAH in bilateral cerebral hemispheres, cerebellum, and basilar cisterns with mild to moderate IVH  - CTH 7/17 4AM with stable hyperdense intraparenchymal hemorrhage, MLS, hyperdense subarachnoid hemorrhage L>R, IVH; mild increase IVH in lateral ventricles compared to 12AM study  - S/p 10u of cryoprecipitate  - Home medication: gabapentin 300mg every morning, 600mg every night  Plan:  - NSU  - Neuro Checks: Q4H  - Neurosurgery consulted  - DNR, plan to transition to comfort care today   - Gabapentin 300mg every morning, 600mg every night  - Pain: acetaminophen PRN, fent gtt at 50   - PT/OT/SLP    Pain medications: PRN Tylenol  Fluids: Replete PRN  Electrolytes: Keep mg >2, phos >3  and K >4  Nutrition:  NPO Diet; Effective now   Antimicrobials: Zosyn and Vancomycin  Antiplatelet  None  DVT PPX: SCD's  GI ppx: Esomeprazole  Bowel care: Senna  Catheter: Bain Catheter  Lines: PIV  Oxygen: ET tube  Drips: Nicardipine    Disposition:   Pending    Code Status: DNR (confirmed on admission)   NOK:  Primary Emergency Contact: Nilson Dumont MD    Attending Attestation:   I saw and evaluated the patient. I personally obtained  the key and critical portions of the history and physical exam or was physically present for key and critical portions performed by the resident/fellow. I reviewed the resident/fellow's documentation and discussed the patient with the resident/fellow. I agree with the resident/fellow's medical decision making as documented in the note with the exception/addition of the following:    L MCA stroke s/p TNK with hemorrhagic transformation.    Acute respiratory failure, intubated, on mech ventilation.     Ongoing GOC discussion.    John Woodard M.D.

## 2024-07-19 NOTE — PROGRESS NOTES
[facility]  NEUROSCIENCE INTENSIVE CARE UNIT  DAILY PROGRESS NOTE       Patient Name: Diego Dumont   MRN: [id number]     Admit Date: 2024     : 1945 AGE: 79 y.o. GENDER: male        Subjective    79 year-old male with past medical history of hypertension, hyperlipidemia, CAD, OA, and BPH who presented as OSH transfer for management of hemorrhagic transformation of left MCA infarct. Patient presented to OSH ED as stroke code, with new onset aphasia. LKW  1700. Initial NIHSS 11. CTH demonstrated left MCA infarct involving insular cortex and frontal operculum. TNK given at  1850. Neurologic exam changed around midnight with new left gaze preference and vomiting. NIHSS 26. CTH demonstrated new hematoma in left frontal lobe and diffuse SAH in bilateral cerebral hemispheres, cerebellum, and basilar cisterns with mild to moderate IVH. Patient was intubated, given 10u of cryoprecipitate, and started on nicardipine infusion at OSH prior to transfer to [facility].     Interval Events: MRSA nares negative, Vancomycin dc'd. Resp culture with mix gram positive and gram negative bacteria. MRI obtained.    Patient unable to participate in exam.     Objective   VITALS (24H):  Temp:  [36.1 °C (97 °F)-36.8 °C (98.2 °F)] 36.1 °C (97 °F)  Heart Rate:  [44-69] 47  Resp:  [8-18] 12  BP: (138-168)/(63-88) 145/63  FiO2 (%):  [30 %-50 %] 30 %  INTAKE/OUTPUT:  Intake/Output Summary (Last 24 hours) at 2024 0729  Last data filed at 2024 0400  Gross per 24 hour   Intake 211.42 ml   Output 1415 ml   Net -1203.58 ml     VENT SETTINGS:  Vent Mode: Volume control/assist control  FiO2 (%):  [30 %-50 %] 30 %  S RR:  [12-14] 14  S VT:  [410 mL-450 mL] 450 mL  PEEP/CPAP (cm H2O):  [5 cm H20] 5 cm H20  TN SUP:  [5 cm H20] 5 cm H20  MAP (cm H2O):  [7-8] 7.6     PHYSICAL EXAM:  NEURO:  - Eyes open to sternal rub, not following commands, midline gaze, 2mm/reactive bilaterally  - Left corneal present, right  corneal present but less brisk compared to L., positive gag & cough  - BLE withdrawal to nox stim, RUE flaccid, LUE spontaneous but much less prior to admission  CV:  - RRR on telemetry, NSR 50-60s (decreased from admission)  - Left radial arterial line in place  - On fentanyl gtt  RESP:  - Regular, unlabored  - Oxygen: ventilator VC FiO2 50%, PEEP 5, RR 16,   :  - Indwelling catheter in place  GI:  - Abdomen NT/ND, soft  SKIN:  - Intact    NIH Stroke Scale  1A. Level of Consciousness: Requires Repeated Stimulation to Arouse or Responds to Pain  1B. Ask Month and Age: No Questions Right  1C. Blink Eyes & Squeeze Hands: Performs 0 Tasks  2. Best Gaze: Partial Gaze Palsy  3. Visual: No Visual Loss  4. Facial Palsy: Partial Paralysis  5A. Motor - Left Arm: Some Effort Against Gravity  5B. Motor - Right Arm: No Effort Against Gravity  6A. Motor - Left Leg: Some Effort Against Gravity  6B. Motor - Right Leg: Some Effort Against Gravity  7. Limb Ataxia: Absent  8. Sensory Loss: Normal  9. Best Language: Mute, Global Aphasia  10. Dysarthria: Intubated  11. Extinction and Inattention: Visual, Tactile, Auditory, Spatial, or Personal Inattention  NIH Stroke Scale: 22     MEDICATIONS:  Scheduled: PRN: Continuous:   atorvastatin, 40 mg, nasogastric tube, Nightly  esomeprazole, 10 mg, nasogastric tube, Daily before breakfast  finasteride, 5 mg, oral, Daily  gabapentin, 300 mg, nasogastric tube, q AM  gabapentin, 600 mg, nasogastric tube, Nightly  insulin lispro, 0-5 Units, subcutaneous, q6h  lidocaine, 1 Application, Topical, Once  oxygen, , inhalation, Continuous - Inhalation  oxymetazoline, 2 spray, Each Nostril, Once  perflutren protein A microsphere, 0.5 mL, intravenous, Once in imaging  piperacillin-tazobactam, 3.375 g, intravenous, q6h  sennosides-docusate sodium, 2 tablet, nasogastric tube, BID  sulfur hexafluoride microsphr, 2 mL, intravenous, Once in imaging     PRN medications: acetaminophen **OR**  acetaminophen **OR** acetaminophen, acetaminophen, dextrose, dextrose, glucagon, glucagon, [] hydrALAZINE **FOLLOWED BY** hydrALAZINE, HYDROmorphone, oxygen fentaNYL,  mcg/hr, Last Rate: 50 mcg/hr (24 1821)  niCARdipine, 2.5-15 mg/hr  sodium chloride 0.9%, 100 mL/hr, Last Rate: 100 mL/hr (24 1000)         LAB RESULTS:  Results from last 72 hours   Lab Units 24  0050 24  0032   WBC AUTO x10*3/uL 18.2* 20.1*   NRBC AUTO /100 WBCs 0.0 0.0   RBC AUTO x10*6/uL 3.80* 3.90*   HEMOGLOBIN g/dL 10.5* 10.7*   HEMATOCRIT % 32.4* 31.9*   MCV fL 85 82   MCH pg 27.6 27.4   MCHC g/dL 32.4 33.5   RDW % 14.6* 14.3   PLATELETS AUTO x10*3/uL 235 225   NEUTROS PCT AUTO % 81.4 82.1   IG PCT AUTO % 0.5 0.7   LYMPHS PCT AUTO % 10.4 9.5   MONOS PCT AUTO % 7.5 7.6   EOS PCT AUTO % 0.0 0.0   BASOS PCT AUTO % 0.2 0.1   NEUTROS ABS x10*3/uL 14.85* 16.49*   IG AUTO x10*3/uL 0.09 0.15   LYMPHS ABS AUTO x10*3/uL 1.89 1.90   MONOS ABS AUTO x10*3/uL 1.37* 1.53*   EOS ABS AUTO x10*3/uL 0.00 0.00   BASOS ABS AUTO x10*3/uL 0.04 0.02       Results from last 72 hours   Lab Units 24  0050 24  0030 24  0029   GLUCOSE mg/dL 112* 142*  --    SODIUM mmol/L 144 143  --    POTASSIUM mmol/L 3.9 3.5  --    CHLORIDE mmol/L 113* 110*  --    CO2 mmol/L 24 23  --    ANION GAP mmol/L 11 14  --    BUN mg/dL 24* 23  --    CREATININE mg/dL 1.04 0.95  --    EGFR mL/min/1.73m*2 73 82  --    CALCIUM mg/dL 8.3* 8.5*  --    PHOSPHORUS mg/dL 2.5 2.5  --    ALBUMIN g/dL 3.1* 3.3*  --    MAGNESIUM mg/dL 2.11 1.92  --    POCT CALCIUM IONIZED (MMOL/L) IN BLOOD mmol/L 1.16  --  1.12      IMAGING RESULTS:  MR brain wo IV contrast         CT head wo IV contrast   Final Result   Worsening interventricular hemorrhage.   Stable appearing midline shift.   On coronal imaging there is concern for expansion of the   intraparenchymal hemorrhage in the Modesta insular cortex.        I personally reviewed the images/study and I agree with the  findings   as stated. This study was interpreted at [facility], [place], [place].        MACRO:   None        Signed by: elmer lopez 7/17/2024 12:33 PM   Dictation workstation:   [id number]      Transthoracic Echo (TTE) Complete   Final Result      CT head wo IV contrast   Final Result   There is again evidence of a large area of irregular hyperdense   intraparenchymal hemorrhage within the lateral left frontal lobe   involving the left frontal lobe operculum and extending caudally into   the left subinsular region similar in appearance when compared with   the prior study dated 07/17/2024. There is surrounding hypodensity   involving cortex underlying white matter compatible with surrounding   edema with a prior CT of the head dated 07/16/2024 demonstrating   subtle asymmetric hypodensity involving cortex and underlying white   matter within left frontal lobe and left insula compatible with an   evolving area of acute infarction suggesting the intraparenchymal   hemorrhage may represent hemorrhagic transformation of the evolving   infarction. There is again evidence of mass effect within the left   cerebral hemisphere contributing to partial effacement of the left   lateral ventricle with mild bowing of the midline structures from   left to right.        There is again evidence of extensive hyperdense subarachnoid   hemorrhage along sulci of the cerebral hemispheres bilaterally left   greater than right similar when compared with the prior study.        There is again evidence of hyperdense intraventricular hemorrhage   best appreciated within the lateral ventricles left greater than   right. There is mild interval increased intraventricular hemorrhage   within the lateral ventricles when compared with the prior study   dated 07/17/2024 time 12:23 a.m.. There is again evidence of   hyperdense intraventricular hemorrhage within the 4th ventricle   slightly less conspicuous on  the current study although the posterior   fossa is suboptimally evaluated secondary to metallic artifact from   the patient's dental hardware.        The ventricular system is similar in size and configuration without   evidence of magaly hydrocephalus.        MACRO:   None.        Signed by: Robin Gonzalez 7/17/2024 5:08 AM   Dictation workstation:   [id number]      XR chest 1 view   Final Result   1.  Persistent right upper lobe collapse and consolidation with right   hemidiaphragmatic elevation.   2. Slightly improved rightward deviation of the trachea, with   endotracheal tube positioned 3.2 cm above the dina.        I personally reviewed the images/study and I agree with the findings   as stated by Hipolito Erazo MD (Radiology Resident).   This study was interpreted at [facility], [place], [place].        MACRO:   None        Signed by: Duarte Kwan 7/17/2024 9:50 AM   Dictation workstation:   [id number]            Assessment/Plan    NEURO:  #Left MCA infarct s/p TNK complicated by hemorrhagic transformation   Assessment:  - Neurologically: see above  - 7/16: TNK given at 1850  - CTH demonstrated new hematoma (4x5.8x4.2cm) in left frontal lobe and diffuse SAH in bilateral cerebral hemispheres, cerebellum, and basilar cisterns with mild to moderate IVH  - CTH 7/17 4AM with stable hyperdense intraparenchymal hemorrhage, MLS, hyperdense subarachnoid hemorrhage L>R, IVH; mild increase IVH in lateral ventricles compared to 12AM study  - S/p 10u of cryoprecipitate  - Home medication: gabapentin 300mg every morning, 600mg every night  - MRI brain 7/19 with L MCA stroke and extensive SAH plus IVH; minimal dilation of ventricles  Plan:  - NSU  - Neuro Checks: Q4H  - SBP goal <180  - Neurosurgery consulted  - DNR, pending further GOC  - Gabapentin 300mg every morning, 600mg every night  - Pain: acetaminophen PRN, fent gtt at 50   -  PT/OT/SLP    CARDIOVASCULAR:  #HTN, HLD  #Bradycardia  Assessment:  - Home medications: lisinopril 10mg daily, pravastatin 40mg daily  - ECHO: LVEF 65%, moderately dilated IVC  - EKG with sinus bradycardia, chronic RBBB and TWI in V1-V3 seen on prior EKGs  Plan:  - Continue to monitor on telemetry  - Atorvastatin 40mg daily  - holding IVF iso HTN  - BP goal: SBP <180 mmHg    --> PRN: Labetalol and Hydralazine   - Off Cardene gtt     RESPIRATORY:  #Acute respiratory insufficiency  #RUL collapse  Assessment:  - Intubated at OSH  - CXR 7/17 Persistent RUL collapse and consolidation with R hemidiaphragmatic elevation; improved deviation of trachea  - Vent settings: VC FiO2 100%, PEEP 5, RR 16,   Plan:  - Continuous pulse oximetry   - O2 PRN to maintain SpO2 > 94%, wean as tolerated  - Ventilator bundle while intubated    RENAL/:  #BPH  Assessment:  - Home medications: finasteride 5mg daily, tamsulosin 0.4mg daily   Results from last 72 hours   Lab Units 07/19/24  0050 07/18/24  0030   BUN mg/dL 24* 23   CREATININE mg/dL 1.04 0.95       Plan:  - Monitor with daily RFP  - Finasteride 5mg daily  - Maintain indwelling catheter for: critically ill patient who need accurate urinary output measurements    FEN/GI:  #No active issues  Assessment:  - Last BM: PTA  Plan:  - Monitor and replace electrolytes per protocol  - holding IVF iso HTN  - Diet: NPO   - Bowel regimen: Modesta-Colace    ENDOCRINE:  #Hyperglycemia  Assessment:  Results from last 7 days   Lab Units 07/19/24  0411 07/19/24  0050 07/18/24  2323 07/18/24  1908 07/18/24  1145 07/18/24  0824 07/18/24  0544 07/18/24  0038 07/18/24  0030   POCT GLUCOSE mg/dL 125*  --  115* 146* 120* 119* 124*   < >  --    GLUCOSE mg/dL  --  112*  --   --   --   --   --   --  142*    < > = values in this interval not displayed.      Plan:  - Accuchecks & ISS Q4H while NPO    HEMATOLOGY:  #Anemia, stable  Assessment:  - most likely dilutional and iso hemorrhagic bleed   Results from  last 7 days   Lab Units 24  0050 24  0032   HEMOGLOBIN g/dL 10.5* 10.7*   HEMATOCRIT % 32.4* 31.9*   PLATELETS AUTO x10*3/uL 235 225     Plan:  - Continue to monitor with daily CBC and Coag panel    INFECTIOUS DISEASE:  #Leukocytosis  Assessment:  Results from last 7 days   Lab Units 24  0050 24  0032   WBC AUTO x10*3/uL 18.2* 20.1*    - Temp (24hrs), Av.6 °C (97.9 °F), Min:36.1 °C (97 °F), Max:36.8 °C (98.2 °F)     - MRSA nares negative   - Sputum culture  with moderate mixed gram positive and gram negative bacteria  Plan:  - Continue to monitor for s/sx of infection  - Pan culture for temperature > 38.4 C  - Zosyn ( - )  - Vancomycin ()    MUSCULOSKELETAL:  - No acute issues    SKIN:  - No acute issues  - Turns and skin care per NSU protocol    ACCESS:  - PIVs  - L radial arterial line    PROPHYLAXIS:  - DVT Ppx: SCDs  - GI Ppx: Esomeprazole    RESTRAINTS:  I agree with nursing assessment of the patient's need for restraints to protect the patient from injury and facilitate healing. The patient is unable to cooperate with the plan of care and at risk for disrupting critical therapy (i.e., removing medical devices, lines, tubes and/or dressings).  Please see order for specifics. Restraints can be removed when the patient is able to cooperate with plan of care and allow healing to occur, or the medical devices at risk are discontinued by the medical team.     Alicia Fung MD  Neuroscience Intensive Care       Plan of care to be discussed with neurocritical care attending     The patient is critically ill with acute encephalopathy, acute respiratory insufficiency, and hemorrhagic transformation following thrombolysis for MCA stroke  Remains in poor neurological condition  Cont BP control with goal sbp<180  Wean ventilator as tolerated  Antibiotics initiated for pneumonia    Patient is DNR Arrest     I have seen and examined the patient.  I have reviewed the patient's  laboratory, radiographic, and clinical data.  I have spent 30 minutes providing critical care for the patient.       FLO Cornejo M.D.

## 2024-07-19 NOTE — PROGRESS NOTES
Vancomycin Dosing by Pharmacy- Cessation of Therapy    Consult to pharmacy for vancomycin dosing has been discontinued by the prescriber, pharmacy will sign off at this time.    Please call pharmacy if there are further questions or re-enter a consult if vancomycin is resumed.     Kaushik Birch, PharmD

## 2024-07-19 NOTE — PROGRESS NOTES
Communication Note    Missed Visit: Yes  Missed Visit Reason:  (GOC discussion with family, defer OT at this time.)      07/19/24 at 8:48 AM   Tiffany Crowder, OT   Rehab Office: [phone]

## 2024-07-19 NOTE — PROGRESS NOTES
Social Work Discharge Planning note:    -Patient discussed during interdisciplinary rounds.   -Team members present: Fellow, PT and OT, and SW  -Plan per medical team: Medical team spoke with family yesterday and they decided for medical team to watch Pt for another day to see if there is any improvement. Family still wants to meet with hospice.   -Payer: Medicare  -Status: Inpatient  -Discharge disposition: Hospice if [facility] is scheduled to meet with family on 7/20/24 at 5:30 pm.   -Anticipated Date of Discharge:  7/21/24    RENEA Hansen, LSW

## 2024-07-19 NOTE — CARE PLAN
Problem: Skin  Goal: Decreased wound size/increased tissue granulation at next dressing change  Outcome: Progressing  Flowsheets (Taken 7/19/2024 1724)  Decreased wound size/increased tissue granulation at next dressing change: Promote sleep for wound healing     Problem: Skin  Goal: Participates in plan/prevention/treatment measures  Outcome: Progressing  Flowsheets (Taken 7/19/2024 1724)  Participates in plan/prevention/treatment measures:   Discuss with provider PT/OT consult   Elevate heels     Problem: Skin  Goal: Prevent/manage excess moisture  Outcome: Progressing  Flowsheets (Taken 7/19/2024 1724)  Prevent/manage excess moisture:   Cleanse incontinence/protect with barrier cream   Follow provider orders for dressing changes     Problem: Skin  Goal: Prevent/minimize sheer/friction injuries  Outcome: Progressing  Flowsheets (Taken 7/19/2024 1724)  Prevent/minimize sheer/friction injuries: Use pull sheet     Problem: Skin  Goal: Promote/optimize nutrition  Outcome: Progressing  Flowsheets (Taken 7/19/2024 1724)  Promote/optimize nutrition: Discuss with provider if NPO > 2 days     Problem: Skin  Goal: Promote skin healing  Outcome: Progressing  Flowsheets (Taken 7/19/2024 1724)  Promote skin healing:   Assess skin/pad under line(s)/device(s)   Protective dressings over bony prominences   The patient's goals for the shift include

## 2024-07-19 NOTE — PROGRESS NOTES
Physical Therapy                 Therapy Communication Note    Patient Name: Diego Dumont  MRN: [id number]  Today's Date: 7/19/2024     Discipline: Physical Therapy    Missed Visit Reason: Missed Visit Reason:  (Pt with ongoing GOC conversation. Plan to hold PT at this time.)    Missed Time: Attempt

## 2024-07-19 NOTE — CARE PLAN
Problem: Skin  Goal: Decreased wound size/increased tissue granulation at next dressing change  Outcome: Progressing  Goal: Participates in plan/prevention/treatment measures  Outcome: Progressing  Goal: Prevent/manage excess moisture  Outcome: Progressing  Goal: Prevent/minimize sheer/friction injuries  Outcome: Progressing  Goal: Promote/optimize nutrition  Outcome: Progressing  Goal: Promote skin healing  Outcome: Progressing     Problem: General Stroke  Goal: Establish a mutual long term goal with patient by discharge  Outcome: Progressing  Goal: Demonstrate improvement in neurological exam throughout the shift  Outcome: Progressing  Goal: Maintain BP within ordered limits throughout shift  Outcome: Progressing  Goal: Participate in treatment (ie., meds, therapy) throughout shift  Outcome: Progressing  Goal: No symptoms of aspiration throughout shift  Outcome: Progressing  Goal: No symptoms of hemorrhage throughout shift  Outcome: Progressing  Goal: Tolerate enteral feeding throughout shift  Outcome: Progressing  Goal: Decreased nausea/vomiting throughout shift  Outcome: Progressing  Goal: Controlled blood glucose throughout shift  Outcome: Progressing  Goal: Out of bed three times today  Outcome: Progressing     Problem: ICU Stroke  Goal: Maintain ICP within ordered limits throughout shift  Outcome: Progressing  Goal: Tolerate EVD clamping trial throughout shift  Outcome: Progressing  Goal: Tolerate ventilator weaning trial during shift  Outcome: Progressing  Goal: Maintain patent airway throughout shift  Outcome: Progressing  Goal: Achieve/maintain targeted sodium level throughout shift  Outcome: Progressing     Problem: Safety - Medical Restraint  Goal: Remains free of injury from restraints (Restraint for Interference with Medical Device)  Outcome: Progressing  Goal: Free from restraint(s) (Restraint for Interference with Medical Device)  Outcome: Progressing     Problem: Fall/Injury  Goal: Not fall by end  of shift  Outcome: Progressing  Goal: Be free from injury by end of the shift  Outcome: Progressing  Goal: Verbalize understanding of personal risk factors for fall in the hospital  Outcome: Progressing  Goal: Verbalize understanding of risk factor reduction measures to prevent injury from fall in the home  Outcome: Progressing  Goal: Use assistive devices by end of the shift  Outcome: Progressing  Goal: Pace activities to prevent fatigue by end of the shift  Outcome: Progressing     Problem: Knowledge Deficit  Goal: Patient/family/caregiver demonstrates understanding of disease process, treatment plan, medications, and discharge instructions  Outcome: Progressing     Problem: Mechanical Ventilation  Goal: Patient Will Maintain Patent Airway  Outcome: Progressing  Goal: Oral health is maintained or improved  Outcome: Progressing  Goal: Tracheostomy will be managed safely  Outcome: Progressing  Goal: ET tube will be managed safely  Outcome: Progressing  Goal: Ability to express needs and understand communication  Outcome: Progressing  Goal: Mobility/activity is maintained at optimum level for patient  Outcome: Progressing     Problem: Pain - Adult  Goal: Verbalizes/displays adequate comfort level or baseline comfort level  Outcome: Progressing     Problem: Safety - Adult  Goal: Free from fall injury  Outcome: Progressing     Problem: Discharge Planning  Goal: Discharge to home or other facility with appropriate resources  Outcome: Progressing     Problem: Chronic Conditions and Co-morbidities  Goal: Patient's chronic conditions and co-morbidity symptoms are monitored and maintained or improved  Outcome: Progressing     Problem: Pain  Goal: Takes deep breaths with improved pain control throughout the shift  Outcome: Progressing  Goal: Turns in bed with improved pain control throughout the shift  Outcome: Progressing  Goal: Walks with improved pain control throughout the shift  Outcome: Progressing  Goal: Performs  ADL's with improved pain control throughout shift  Outcome: Progressing  Goal: Participates in PT with improved pain control throughout the shift  Outcome: Progressing  Goal: Free from opioid side effects throughout the shift  Outcome: Progressing  Goal: Free from acute confusion related to pain meds throughout the shift  Outcome: Progressing

## 2024-07-20 VITALS
DIASTOLIC BLOOD PRESSURE: 73 MMHG | RESPIRATION RATE: 17 BRPM | OXYGEN SATURATION: 95 % | HEIGHT: 68 IN | WEIGHT: 145.94 LBS | BODY MASS INDEX: 22.12 KG/M2 | TEMPERATURE: 98.2 F | HEART RATE: 92 BPM | SYSTOLIC BLOOD PRESSURE: 185 MMHG

## 2024-07-20 LAB
ALBUMIN SERPL BCP-MCNC: 3.1 G/DL (ref 3.4–5)
ANION GAP SERPL CALC-SCNC: 16 MMOL/L (ref 10–20)
APTT PPP: 28 SECONDS (ref 27–38)
BASOPHILS # BLD AUTO: 0.04 X10*3/UL (ref 0–0.1)
BASOPHILS NFR BLD AUTO: 0.2 %
BUN SERPL-MCNC: 26 MG/DL (ref 6–23)
CA-I BLD-SCNC: 1.18 MMOL/L (ref 1.1–1.33)
CALCIUM SERPL-MCNC: 8.5 MG/DL (ref 8.6–10.6)
CHLORIDE SERPL-SCNC: 111 MMOL/L (ref 98–107)
CO2 SERPL-SCNC: 23 MMOL/L (ref 21–32)
CREAT SERPL-MCNC: 1.02 MG/DL (ref 0.5–1.3)
EGFRCR SERPLBLD CKD-EPI 2021: 75 ML/MIN/1.73M*2
EOSINOPHIL # BLD AUTO: 0 X10*3/UL (ref 0–0.4)
EOSINOPHIL NFR BLD AUTO: 0 %
ERYTHROCYTE [DISTWIDTH] IN BLOOD BY AUTOMATED COUNT: 14.5 % (ref 11.5–14.5)
GLUCOSE BLD MANUAL STRIP-MCNC: 118 MG/DL (ref 74–99)
GLUCOSE BLD MANUAL STRIP-MCNC: 130 MG/DL (ref 74–99)
GLUCOSE BLD MANUAL STRIP-MCNC: 133 MG/DL (ref 74–99)
GLUCOSE SERPL-MCNC: 125 MG/DL (ref 74–99)
HCT VFR BLD AUTO: 31.9 % (ref 41–52)
HGB BLD-MCNC: 10.6 G/DL (ref 13.5–17.5)
IMM GRANULOCYTES # BLD AUTO: 0.21 X10*3/UL (ref 0–0.5)
IMM GRANULOCYTES NFR BLD AUTO: 0.9 % (ref 0–0.9)
INR PPP: 1.5 (ref 0.9–1.1)
LYMPHOCYTES # BLD AUTO: 2.21 X10*3/UL (ref 0.8–3)
LYMPHOCYTES NFR BLD AUTO: 9.9 %
MAGNESIUM SERPL-MCNC: 2.24 MG/DL (ref 1.6–2.4)
MCH RBC QN AUTO: 27.2 PG (ref 26–34)
MCHC RBC AUTO-ENTMCNC: 33.2 G/DL (ref 32–36)
MCV RBC AUTO: 82 FL (ref 80–100)
MONOCYTES # BLD AUTO: 1.65 X10*3/UL (ref 0.05–0.8)
MONOCYTES NFR BLD AUTO: 7.4 %
NEUTROPHILS # BLD AUTO: 18.21 X10*3/UL (ref 1.6–5.5)
NEUTROPHILS NFR BLD AUTO: 81.6 %
NRBC BLD-RTO: 0 /100 WBCS (ref 0–0)
PHOSPHATE SERPL-MCNC: 2.8 MG/DL (ref 2.5–4.9)
PLATELET # BLD AUTO: 246 X10*3/UL (ref 150–450)
POTASSIUM SERPL-SCNC: 3.5 MMOL/L (ref 3.5–5.3)
PROTHROMBIN TIME: 16.4 SECONDS (ref 9.8–12.8)
RBC # BLD AUTO: 3.89 X10*6/UL (ref 4.5–5.9)
SODIUM SERPL-SCNC: 146 MMOL/L (ref 136–145)
WBC # BLD AUTO: 22.3 X10*3/UL (ref 4.4–11.3)

## 2024-07-20 PROCEDURE — 85025 COMPLETE CBC W/AUTO DIFF WBC: CPT

## 2024-07-20 PROCEDURE — 94003 VENT MGMT INPAT SUBQ DAY: CPT

## 2024-07-20 PROCEDURE — 82947 ASSAY GLUCOSE BLOOD QUANT: CPT

## 2024-07-20 PROCEDURE — 2500000004 HC RX 250 GENERAL PHARMACY W/ HCPCS (ALT 636 FOR OP/ED): Performed by: REGISTERED NURSE

## 2024-07-20 PROCEDURE — 82330 ASSAY OF CALCIUM: CPT

## 2024-07-20 PROCEDURE — C9113 INJ PANTOPRAZOLE SODIUM, VIA: HCPCS

## 2024-07-20 PROCEDURE — 80069 RENAL FUNCTION PANEL: CPT

## 2024-07-20 PROCEDURE — 2500000005 HC RX 250 GENERAL PHARMACY W/O HCPCS: Performed by: STUDENT IN AN ORGANIZED HEALTH CARE EDUCATION/TRAINING PROGRAM

## 2024-07-20 PROCEDURE — 2500000004 HC RX 250 GENERAL PHARMACY W/ HCPCS (ALT 636 FOR OP/ED): Performed by: NURSE PRACTITIONER

## 2024-07-20 PROCEDURE — 2500000004 HC RX 250 GENERAL PHARMACY W/ HCPCS (ALT 636 FOR OP/ED)

## 2024-07-20 PROCEDURE — 85610 PROTHROMBIN TIME: CPT

## 2024-07-20 PROCEDURE — 83735 ASSAY OF MAGNESIUM: CPT

## 2024-07-20 PROCEDURE — 37799 UNLISTED PX VASCULAR SURGERY: CPT

## 2024-07-20 PROCEDURE — 99239 HOSP IP/OBS DSCHRG MGMT >30: CPT

## 2024-07-20 RX ORDER — MORPHINE SULFATE IN 0.9 % NACL 30 MG/30ML
PATIENT CONTROLLED ANALGESIA SYRINGE INTRAVENOUS CONTINUOUS
Status: DISCONTINUED | OUTPATIENT
Start: 2024-07-20 | End: 2024-07-20 | Stop reason: HOSPADM

## 2024-07-20 RX ORDER — MORPHINE SULFATE 4 MG/ML
2 INJECTION INTRAVENOUS
Status: DISCONTINUED | OUTPATIENT
Start: 2024-07-20 | End: 2024-07-20 | Stop reason: HOSPADM

## 2024-07-20 RX ORDER — GLYCOPYRROLATE 0.2 MG/ML
0.2 INJECTION INTRAMUSCULAR; INTRAVENOUS EVERY 4 HOURS PRN
Status: DISCONTINUED | OUTPATIENT
Start: 2024-07-20 | End: 2024-07-20 | Stop reason: HOSPADM

## 2024-07-20 RX ORDER — HALOPERIDOL 5 MG/ML
1 INJECTION INTRAMUSCULAR EVERY 4 HOURS PRN
Status: DISCONTINUED | OUTPATIENT
Start: 2024-07-20 | End: 2024-07-20 | Stop reason: HOSPADM

## 2024-07-20 RX ORDER — LORAZEPAM 2 MG/ML
0.5 INJECTION INTRAMUSCULAR EVERY 4 HOURS PRN
Status: DISCONTINUED | OUTPATIENT
Start: 2024-07-20 | End: 2024-07-20 | Stop reason: HOSPADM

## 2024-07-20 RX ORDER — LORAZEPAM 2 MG/ML
2 INJECTION INTRAMUSCULAR EVERY 10 MIN PRN
Status: DISCONTINUED | OUTPATIENT
Start: 2024-07-20 | End: 2024-07-20 | Stop reason: HOSPADM

## 2024-07-20 ASSESSMENT — PAIN - FUNCTIONAL ASSESSMENT
PAIN_FUNCTIONAL_ASSESSMENT: CPOT (CRITICAL CARE PAIN OBSERVATION TOOL)

## 2024-07-20 NOTE — DISCHARGE SUMMARY
Discharge Diagnosis  Hemorrhagic stroke (Multi)    Problem List  Principal Problem:    Hemorrhagic stroke (Multi)  Active Problems:    Benign essential HTN    HLD (hyperlipidemia)    Cerebrovascular accident (CVA) due to embolism of left middle cerebral artery (Multi)    Received tissue plasminogen activator (t-PA) less than 24 hours prior to arrival      Diego Dumont is a 79 y.o. male who presented to the hospital with new onset aphasia. They were diagnosed with a stroke.  Etiology: Ischemic Stroke: Large artery atherosclerosis    Relevant hospital complications: s/p TNK c/b hemorrhagic conversion    MR brain wo IV contrast    Result Date: 7/19/2024  Interpreted By:  Ian Goldberg and Baker Zachary STUDY: MR BRAIN WO IV CONTRAST;  7/19/2024 3:00 am   INDICATION: Signs/Symptoms:Neuro Prognostication.   COMPARISON: CT head on 07/17/2024.   ACCESSION NUMBER(S): [id number]   ORDERING CLINICIAN: KERMIT EUCEDA   TECHNIQUE: Axial T2, FLAIR, DWI, gradient echo T2 and sagittal and coronal T1 weighted images of brain were acquired.   FINDINGS: There is predominantly cortically based diffusion restriction signal abnormality throughout the left frontal lobe, left insular cortex, left frontal operculum, and extending to the anterior left parietal lobe, consistent with left MCA territory infarct. There is corresponding T2/FLAIR hyperintense signal within these regions as well as vasogenic edema. There is effacement of the sulci throughout the left frontal lobe and partial effacement of the left lateral ventricle. There is a approximately 2 mm of left-to-right midline shift as measured at the septum pellucidum at the level of the foramen of Monro, new/increased compared to prior CT considering differences in imaging technique. There is heterogenous T2/FLAIR hypointense signal throughout the region of infarct within the left frontal lobe, with associated susceptibility artifact on gradient echo T2 weighted images, consistent  with intraparenchymal blood products.   Extensive subarachnoid blood products diffusely throughout the sulci of the bilateral cerebral hemispheres, similar to prior CT. There is layering heterogenous fluid within the occipital horns of the lateral ventricles bilaterally, right-greater-than-left, as well as hyperintense signal within the 3rd and 4th ventricles, consistent with intraventricular blood products. Ventricular caliber is unchanged from CT examination with minimal dilatation.   The presence of extensive diffusion restriction signal abnormality related to widespread subarachnoid hemorrhage limits evaluation for additional subtle tiny cortical insults. Few T2/FLAIR hyperintense foci throughout the subcortical and periventricular white matter, which are nonspecific however may represent sequelae of chronic small-vessel ischemic disease. Small arachnoid cyst suggested within the anterior mesial aspect of the left middle cranial fossa. Basilar cisterns are patent.   Bilateral mastoid effusions. Mild mucosal thickening of scattered ethmoid air cells. Remaining paranasal sinuses are clear. Nasopharyngeal secretions noted. Bilateral native lens extractions.       1. Findings consistent with hemorrhagic transformation of anterior left MCA territory infarct, with vasogenic edema throughout the left frontal lobe and associated mass effect including left frontal lobe sulcal effacement and partial effacement of the left lateral ventricle. Additionally, there is development of 2 mm of left-to-right midline shift which appears new/worsened when compared to prior CT. 2. Extensive subarachnoid hemorrhage diffusely throughout the bilateral cerebral hemispheres, as well as layering intraventricular hemorrhage within the lateral, 3rd, and 4th ventricles. Similar minimal dilatation of the ventricles.   I personally reviewed the images/study and I agree with the findings as stated by Dr. Petros Gonzalez M.D. This study was  interpreted at [place], [place].   MACRO: None   Signed by: Ian Goldberg 7/19/2024 8:10 AM Dictation workstation:   [id number]   CT head wo IV contrast    Result Date: 7/17/2024  Interpreted By:  elmer lopez and Hooper Grayson STUDY: CT HEAD WO IV CONTRAST;  7/17/2024 11:24 am   INDICATION: Signs/Symptoms:monitor hemorrhage.   COMPARISON: CT of the head obtained same day at 0408 hours.   ACCESSION NUMBER(S): [id number]   ORDERING CLINICIAN: TAMIKO YODER   TECHNIQUE: Noncontrast helical CT assessment of the head was performed. Axial 5 x 5 and 2 x 2 soft tissue as well as 2 x 2 bone reformations were provided. 2 x 2 coronal and sagittal reformations were also provided.   FINDINGS: Hyperdense intraparenchymal material observed in the left frontal opercular and [place]-insular cortex. This is of slightly increased size and conspicuity when compared to the most recent CT on coronal sequencing ( series 205, image 59). There is mild ventricular effacement of the frontal horn of the left lateral ventricle. This appears stable. There is increasing high density hemorrhagic contents observed within the posterior horns of the lateral ventricles, left-greater-than-right. Bilateral subarachnoid hemorrhagic material is observed along the bifrontal and biparietal lobes. Posterior extension is noted of hemorrhagic contents into the occipital region of similar appearance to the comparison CT.   No midline shift. No evidence of herniation.   Presumed endotracheal tube incompletely evaluated.   Mastoid air cells are clear. No calvarial abnormality. Limited assessment of the orbits and globes unremarkable.       Worsening interventricular hemorrhage. Stable appearing midline shift. On coronal imaging there is concern for expansion of the intraparenchymal hemorrhage in the [place] insular cortex.   I personally reviewed the images/study and I agree with the findings as stated. This study was  interpreted at [facility], [place], [place].   MACRO: None   Signed by: elmer lopez 7/17/2024 12:33 PM Dictation workstation:   [id number]    CT head wo IV contrast    Result Date: 7/17/2024  Interpreted By:  Robin Gonzalez, STUDY: CT HEAD WO IV CONTRAST;  7/17/2024 4:08 am   INDICATION: Signs/Symptoms:6 hour stability scan.   COMPARISON: 07/17/2024 time 12:23 a.m.   ACCESSION NUMBER(S): [id number]   ORDERING CLINICIAN: GIACOMO ARANDA   TECHNIQUE: Axial CT images of the head were obtained without intravenous contrast administration.   FINDINGS: There is again evidence of a large area of irregular hyperdense intraparenchymal hemorrhage within the lateral left frontal lobe involving the left frontal lobe operculum and extending caudally into the left subinsular region similar in appearance when compared with the prior study dated 07/17/2024. There is surrounding hypodensity involving cortex underlying white matter compatible with surrounding edema with a prior CT of the head dated 07/16/2024 demonstrating subtle asymmetric hypodensity involving cortex and underlying white matter within left frontal lobe and left insula compatible with an evolving area of acute infarction suggesting the intraparenchymal hemorrhage may represent hemorrhagic transformation of the evolving infarction. There is again evidence of mass effect within the left cerebral hemisphere contributing to partial effacement of the left lateral ventricle with mild bowing of the midline structures from left to right.   There is again evidence of extensive hyperdense subarachnoid hemorrhage along sulci of the cerebral hemispheres bilaterally left greater than right similar when compared with the prior study.   There is again evidence of hyperdense intraventricular hemorrhage best appreciated within the lateral ventricles left greater than right. There is mild interval increased intraventricular hemorrhage within the  lateral ventricles when compared with the prior study dated 07/17/2024 time 12:23 a.m.. There is again evidence of hyperdense intraventricular hemorrhage within the 4th ventricle slightly less conspicuous on the current study although the posterior fossa is suboptimally evaluated secondary to metallic artifact from the patient's dental hardware.   The ventricular system is similar in size and configuration without evidence of magaly hydrocephalus.   The paranasal sinuses and mastoid air cells are clear.   A partially imaged endotracheal tube is noted in place.       There is again evidence of a large area of irregular hyperdense intraparenchymal hemorrhage within the lateral left frontal lobe involving the left frontal lobe operculum and extending caudally into the left subinsular region similar in appearance when compared with the prior study dated 07/17/2024. There is surrounding hypodensity involving cortex underlying white matter compatible with surrounding edema with a prior CT of the head dated 07/16/2024 demonstrating subtle asymmetric hypodensity involving cortex and underlying white matter within left frontal lobe and left insula compatible with an evolving area of acute infarction suggesting the intraparenchymal hemorrhage may represent hemorrhagic transformation of the evolving infarction. There is again evidence of mass effect within the left cerebral hemisphere contributing to partial effacement of the left lateral ventricle with mild bowing of the midline structures from left to right.   There is again evidence of extensive hyperdense subarachnoid hemorrhage along sulci of the cerebral hemispheres bilaterally left greater than right similar when compared with the prior study.   There is again evidence of hyperdense intraventricular hemorrhage best appreciated within the lateral ventricles left greater than right. There is mild interval increased intraventricular hemorrhage within the lateral ventricles  when compared with the prior study dated 07/17/2024 time 12:23 a.m.. There is again evidence of hyperdense intraventricular hemorrhage within the 4th ventricle slightly less conspicuous on the current study although the posterior fossa is suboptimally evaluated secondary to metallic artifact from the patient's dental hardware.   The ventricular system is similar in size and configuration without evidence of magaly hydrocephalus.   MACRO: None.   Signed by: Robin Gonzalez 7/17/2024 5:08 AM Dictation workstation:   [id number]    CT head wo IV contrast    Result Date: 7/17/2024  Interpreted By:  Teresa Seals, STUDY: CT HEAD WO IV CONTRAST;  7/17/2024 12:23 am   INDICATION: Signs/Symptoms:STROKE, VOMITING, POST TNK.   COMPARISON: Noncontrast head CT and CT angiography head and neck 07/16/2024   ACCESSION NUMBER(S): [id number]   ORDERING CLINICIAN: CHRISTIE ROBERTSON   TECHNIQUE: Axial noncontrast CT images of the head.   FINDINGS: BRAIN PARENCHYMA:  There is loss of gray-white differentiation involving the left frontal lobe, however, much of the previously seen area of infarct is obscured. There is a mass effect with diffuse left hemispheric edema and compression of the left lateral and 3rd ventricles. There is effacement of the basal cisterns and crowding of the foramen magnum.   HEMORRHAGE: New acute intraparenchymal hematoma in the left frontal lobe measuring at least 4 x 5.8 x 4.2 cm. There is diffuse subarachnoid hemorrhage involving the bilateral the supratentorial cerebrum as well as the cerebellum and basal cisterns. VENTRICLES and EXTRA-AXIAL SPACES: Small to moderate amount of intraventricular hemorrhage, layering in the lateral ventricles. EXTRACRANIAL SOFT TISSUES: Within normal limits. PARANASAL SINUSES/MASTOIDS:  The visualized paranasal sinuses and mastoid air cells are aerated. CALVARIUM: No depressed skull fracture. No destructive osseous lesion.   OTHER FINDINGS: None.       New large acute  intraparenchymal hematoma centered in the left frontal lobe measuring at least 4 x 5.8 x 4.2 cm, with diffuse large volume subarachnoid hemorrhage in the bilateral cerebral hemispheres, cerebellum and basilar cisterns. Mild-to-moderate volume intraventricular hemorrhage.   Diffuse edema involving the left cerebral hemisphere with crowding of the basal cisterns, concerning for transtentorial/uncal herniation and crowding of the foramen magnum concerning for tonsillar herniation.     MACRO: Teresa Seals discussed the significance and urgency of this critical finding by telephone with  CHRISTIE ROBERTSON on 7/17/2024 at 1:10 am. (**-RCF-**) Findings:  See findings.   Signed by: Teresa Seals 7/17/2024 1:16 AM Dictation workstation:   [id number]   Transthoracic Echo (TTE) Complete    Result Date: 7/17/2024   [facility], [address] [place] [address]                Tel [phone] and Fax [phone] TRANSTHORACIC ECHOCARDIOGRAM REPORT  Patient Name:      IVONE Denise Physician:    [id number] Ar Adair MD Study Date:        7/17/2024            Ordering Provider:    [id number] TAMIKO YODER MRN/PID:           [id number]             Fellow: Accession#:        [id number]         Nurse: Date of Birth/Age: 1945 / 79 years  Sonographer:          Cherelle Champagne RDCS Gender:            M                    Additional Staff: Height:            172.72 cm            Admit Date:           7/17/2024 Weight:            70.76 kg             Admission Status:     Inpatient -                                                               Routine BSA / BMI:         1.84 m2 / 23.72      Encounter#:           [id number]                    kg/m2 Blood Pressure:    106/54 mmHg          Department Location:  [facility] Study Type:    TRANSTHORACIC ECHO (TTE) COMPLETE Diagnosis/ICD: Cerebral infarction due to unspecified occlusion or stenosis  of                left middle cerebral artery-I63.512 Indication:    Stroke CPT Code:      Echo Complete w Full Doppler-[id number] Patient History: Pertinent History: Stroke, HTN, HLD, Basal cell carcinoma, CAD. Study Detail: The following Echo studies were performed: 2D, M-Mode and Doppler.               Technically challenging study due to body habitus, patient lying               in supine position, prominent lung artifact and poor acoustic               windows. The patient is intubated. Definity used as a contrast               agent for endocardial border definition and agitated saline used               as a contrast agent for intraseptal flow evaluation. Total               contrast used for this procedure was 3.0 mL via IV push.  PHYSICIAN INTERPRETATION: Left Ventricle: Left ventricular ejection fraction is normal, by visual estimate at 65%. There are no regional wall motion abnormalities. The left ventricular cavity size is normal. Spectral Doppler shows an impaired relaxation pattern of left ventricular diastolic filling. Left Atrium: The left atrium is normal in size. A bubble study using agitated saline was performed. Bubble study is negative. Right Ventricle: The right ventricle is normal in size. There is normal right ventricular global systolic function. Right Atrium: The right atrium is normal in size. Aortic Valve: The aortic valve is probably trileaflet. There is trivial aortic valve regurgitation. The peak instantaneous gradient of the aortic valve is 6.2 mmHg. Mitral Valve: The mitral valve is normal in structure. There is trace mitral valve regurgitation. Tricuspid Valve: The tricuspid valve is structurally normal. There is trace tricuspid regurgitation. Pulmonic Valve: The pulmonic valve is not well visualized. There is trace pulmonic valve regurgitation. Pericardium: There is a trivial pericardial effusion. Aorta: The aortic root is normal. The aortic root is at the upper limits of normal size.  Systemic Veins: The inferior vena cava appears moderately dilated. There is absent inspiratory collapse of the IVC.  CONCLUSIONS:  1. Left ventricular ejection fraction is normal, by visual estimate at 65%.  2. Spectral Doppler shows an impaired relaxation pattern of left ventricular diastolic filling.  3. There is normal right ventricular global systolic function.  4. The inferior vena cava appears moderately dilated. QUANTITATIVE DATA SUMMARY: 2D MEASUREMENTS:                    Normal Ranges: Ao Root d: 3.60 cm (2.0-3.7cm) AORTA MEASUREMENTS:                      Normal Ranges: Ao Sinus, d: 3.60 cm (2.1-3.5cm) LV SYSTOLIC FUNCTION BY 2D PLANIMETRY (MOD):                      Normal Ranges: EF-Visual:      65 % LV EF Reported: 65 % LV DIASTOLIC FUNCTION:                           Normal Ranges: MV Peak E:    0.68 m/s    (0.7-1.2 m/s) MV Peak A:    0.88 m/s    (0.42-0.7 m/s) E/A Ratio:    0.77        (1.0-2.2) MV e'         0.060 m/s   (>8.0) MV lateral e' 0.07 m/s MV medial e'  0.05 m/s MV A Dur:     129.00 msec E/e' Ratio:   11.21       (<8.0) a'            0.10 m/s MV DT:        242 msec    (150-240 msec) MITRAL VALVE:                 Normal Ranges: MV DT: 242 msec (150-240msec) AORTIC VALVE:                         Normal Ranges: AoV Vmax:      1.24 m/s (<=1.7m/s) AoV Peak P.2 mmHg (<20mmHg) LVOT Max Kory:  1.03 m/s (<=1.1m/s) LVOT VTI:      20.50 cm LVOT Diameter: 2.00 cm  (1.8-2.4cm) AoV Area,Vmax: 2.61 cm2 (2.5-4.5cm2)  RIGHT VENTRICLE: TAPSE: 24.8 mm RV s'  0.18 m/s TRICUSPID VALVE/RVSP:                   Normal Ranges: IVC Diam: 2.50 cm PULMONIC VALVE:                         Normal Ranges: PV Accel Time: 106 msec (>120ms) PV Max Kory:    1.0 m/s  (0.6-0.9m/s) PV Max PG:     3.8 mmHg  [id number] Ar Adair MD Electronically signed on 2024 at 11:13:56 AM  ** Final **        Results from last 7 days   Lab Units 24  0306   HEMOGLOBIN A1C % 5.5     BNP   Date/Time Value Ref Range Status    07/17/2024 03:06  (H) 0 - 99 pg/mL Final       Test Results Pending At Discharge  Pending Labs       Order Current Status    Magnesium Collected (07/19/24 0840)    Renal function panel Collected (07/19/24 0840)    Respiratory Culture/Smear Preliminary result            Hospital Course  79 year-old male with past medical history of hypertension, hyperlipidemia, CAD, OA, and BPH who presented as OSH transfer for management of hemorrhagic transformation of left MCA infarct. Patient presented to OSH ED as stroke code, with new onset aphasia. LKW 7/16 1700. Initial NIHSS 11. CTH demonstrated left MCA infarct involving insular cortex and frontal operculum. TNK given at 7/16 1850. Neurologic exam changed around midnight with new left gaze preference and vomiting. NIHSS 26. CTH demonstrated new hematoma in left frontal lobe and diffuse SAH in bilateral cerebral hemispheres, cerebellum, and basilar cisterns with mild to moderate IVH. Patient was intubated, given 10u of cryoprecipitate, and started on nicardipine infusion at OSH prior to transfer to [facility]. MRI brain 7/19 with L MCA stroke and extensive SAH plus IVH; minimal dilation of ventricles.     Goals of care conversations were held with family and decision was made to transition to comfort care 7/20. Pt subsequently discharged to hospice house in [place].    Outpatient Follow-Up  Future Appointments   Date Time Provider Department Center   8/26/2024  9:00 AM Krista Gonzalez DO [id number]   9/11/2024  9:00 AM VIDA Ayala-CNP, DNP [facility]       Jorge Randolph DO  PGY-2 Neurology

## 2024-07-20 NOTE — PROGRESS NOTES
Physical Therapy                 Therapy Communication Note    Patient Name: Diego uDmont  MRN: [id number]  Today's Date: 7/20/2024     Discipline: Physical Therapy    Missed Visit Reason:  (915 Hold.  Plan to withdraw care today per RN.)    Missed Time: 915 Attempt

## 2024-07-20 NOTE — SIGNIFICANT EVENT
[facility]  NEUROSCIENCE INTENSIVE CARE UNIT  ADVANCED CARE PLANNING       Goals of Care Discussion  Discussion Date: 07/20/24    Patient: Diego Dumont   Primary Diagnosis: Hemorrhagic stroke (Multi)    Code Status: DNR  Discussion Participants: son    Per previous discussions with family and the healthcare team, the goal of care today was to transition the patient to comfort care and palliatively extubate.  I spoke with the patient's son at bedside, and he informed me that the patient's family is ready for the patient's orders to be changed, and requested that him and his family have a few minutes with the patient (~30 minutes) to say some prayers and make some phone calls to family who couldn't be present.  Spiritual care consult was offered but declined.  Hospice consult in place.  RT and nursing notified of plan.    All questions were answered.     Treatment Priorities: Goals of Care: comfort is paramount; other goals are not relevant or achievable    Given these priorities, the decision was made to change the patient's code status to DNR Comfort Measures Only.     Twila Sharma, APRN-CNP  Neuroscience Intensive Care Unit    Time Statement: Total Additional Critical Care time of 30 minutes, with > 50% of time spent in direct contact with patient/family for education, counseling and coordination of care.

## 2024-07-20 NOTE — PROGRESS NOTES
"Diego Dumont is a 79 y.o. male on day 3 of admission presenting with Hemorrhagic stroke (Multi) post TNK.    Subjective   NAEON. Patient is intubated on mechanical ventilation       Objective     Last Recorded Vitals  Blood pressure 153/62, pulse 50, temperature 36.8 °C (98.2 °F), temperature source Temporal, resp. rate 14, height 1.727 m (5' 7.99\"), weight 66.2 kg (145 lb 15.1 oz), SpO2 99%.    Physical Exam  Neurological Exam  Relevant Results    NIH Stroke Scale  1A. Level of Consciousness: Requires Repeated Stimulation to Arouse or Responds to Pain  1B. Ask Month and Age: No Questions Right  1C. Blink Eyes & Squeeze Hands: Performs 0 Tasks  2. Best Gaze: Normal  3. Visual: No Visual Loss  4. Facial Palsy: Normal Symmetrical Movements  5A. Motor - Left Arm: Some Effort Against Gravity  5B. Motor - Right Arm: No Movement  6A. Motor - Left Leg: No Effort Against Gravity  6B. Motor - Right Leg: No Effort Against [place]  7. Limb Ataxia: Absent  8. Sensory Loss: Mild-to-Moderate Sensory Loss  9. Best Language: No Aphasia  10. Dysarthria: Intubated  11. Extinction and Inattention: Profound Jai-Inattention or Extinction to More than One Modality  NIH Stroke Scale: 21           [place] Coma Scale  Best Eye Response: To pain  Best Verbal Response: None  Best Motor Response: Withdraws to pain  [place] Coma Scale Score: 7      Assessment/Plan   This patient currently has cardiac telemetry ordered; if you would like to modify or discontinue the telemetry order, click here to go to the orders activity to modify/discontinue the order.  Principal Problem:    Hemorrhagic stroke (Multi)  Active Problems:    Benign essential HTN    HLD (hyperlipidemia)    Cerebrovascular accident (CVA) due to embolism of left middle cerebral artery (Multi)    Received tissue plasminogen activator (t-PA) less than 24 hours prior to arrival  Diego Dumont 79 y.o. male admitted 7/17/2024 LOS day 1.   LKW 5pm witnessed by family, NIHSS 11 " mute, R hemiparesis. CT- no hemorrhage but subtle L frontal hypodensity, Given TNK with early improvement.  CTA no LVO so plan was to transfer to community hospital for post-TNK care. Developed N, V, repeat CT with major reperfusion hemorrhage, IVH, SAH, with cytotoxic edema and brain compression. Intubated, given cryoprecipitate and transferred to [facility]. On arrival NIHSS 22. Vascular risk factors- HTN< HPL, CAD. CODE STATUS changed to DNR/DNI on 7/17 ongoing goals of care discussion with the family.    Updates  -Ongoing [facility] discussion with family.  -Rest of medical care by the NICU team.    #Left MCA infarct s/p TNK complicated by hemorrhagic transformation   Assessment:  - Neurologically: see above  - 7/16: TNK given at 1850  - CTH demonstrated new hematoma (4x5.8x4.2cm) in left frontal lobe and diffuse SAH in bilateral cerebral hemispheres, cerebellum, and basilar cisterns with mild to moderate IVH  - CTH 7/17 4AM with stable hyperdense intraparenchymal hemorrhage, MLS, hyperdense subarachnoid hemorrhage L>R, IVH; mild increase IVH in lateral ventricles compared to 12AM study  - S/p 10u of cryoprecipitate  - Home medication: gabapentin 300mg every morning, 600mg every night  Plan:  - NSU  - Neuro Checks: Q4H  - Neurosurgery consulted  - DNR, plan to transition to comfort care today   - Gabapentin 300mg every morning, 600mg every night  - Pain: acetaminophen PRN, fent gtt at 50   - PT/OT/SLP    Pain medications: PRN Tylenol  Fluids: Replete PRN  Electrolytes: Keep mg >2, phos >3  and K >4  Nutrition:  NPO Diet; Effective now   Antimicrobials: Zosyn and Vancomycin  Antiplatelet  None  DVT PPX: SCD's  GI ppx: Esomeprazole  Bowel care: Senna  Catheter: Bain Catheter  Lines: PIV  Oxygen: ET tube  Drips: Nicardipine    Disposition:   Pending    Code Status: DNR (confirmed on admission)   NOK:  Primary Emergency Contact: Nilson Dumont MD

## 2024-07-20 NOTE — PROGRESS NOTES
[facility]  NEUROSCIENCE INTENSIVE CARE UNIT  DAILY PROGRESS NOTE       Patient Name: Diego Dumont   MRN: [id number]     Admit Date: 2024     : 1945 AGE: 79 y.o. GENDER: male        Subjective    79 year-old male with past medical history of hypertension, hyperlipidemia, CAD, OA, and BPH who presented as OSH transfer for management of hemorrhagic transformation of left MCA infarct. Patient presented to OSH ED as stroke code, with new onset aphasia. LKW  1700. Initial NIHSS 11. CTH demonstrated left MCA infarct involving insular cortex and frontal operculum. TNK given at  1850. Neurologic exam changed around midnight with new left gaze preference and vomiting. NIHSS 26. CTH demonstrated new hematoma in left frontal lobe and diffuse SAH in bilateral cerebral hemispheres, cerebellum, and basilar cisterns with mild to moderate IVH. Patient was intubated, given 10u of cryoprecipitate, and started on nicardipine infusion at OSH prior to transfer to [facility].     Interval Events: Goals of care conversations held with family with a decision to transition to comfort care .  No other acute events overnight.     Objective   VITALS (24H):  Temp:  [36.6 °C (97.9 °F)-37.9 °C (100.2 °F)] 36.7 °C (98.1 °F)  Heart Rate:  [47-74] 50  Resp:  [11-19] 14  BP: (137-194)/(58-73) 153/62  Arterial Line BP 1: (155-203)/(44-73) 171/57  FiO2 (%):  [30 %] 30 %  INTAKE/OUTPUT:  Intake/Output Summary (Last 24 hours) at 2024 0950  Last data filed at 2024 0600  Gross per 24 hour   Intake 390 ml   Output 1415 ml   Net -1025 ml     VENT SETTINGS:  Vent Mode: Volume control/assist control  FiO2 (%):  [30 %] 30 %  S RR:  [14] 14  S VT:  [450 mL] 450 mL  PEEP/CPAP (cm H2O):  [5 cm H20] 5 cm H20  TX SUP:  [5 cm H20] 5 cm H20  MAP (cm H2O):  [7] 7     PHYSICAL EXAM:  NEURO:  - Eyes open to sternal rub, not following commands, midline gaze, 2mm/reactive bilaterally  - Left corneal present, right corneal  present but less brisk compared to L., positive gag & cough  - BLE withdrawal to nox stim, RUE flaccid, LUE spontaneous but much less prior to admission  CV:  - RRR on telemetry, NSR 50-60s (decreased from admission)  - Left radial arterial line in place  - On fentanyl gtt  RESP:  - Regular, unlabored  - Oxygen: ventilator VC FiO2 50%, PEEP 5, RR 16,   :  - Indwelling catheter in place  GI:  - Abdomen NT/ND, soft  SKIN:  - Intact    NIH Stroke Scale  1A. Level of Consciousness: Requires Repeated Stimulation to Arouse or Responds to Pain  1B. Ask Month and Age: No Questions Right  1C. Blink Eyes & Squeeze Hands: Performs 0 Tasks  2. Best Gaze: Partial Gaze Palsy  3. Visual: No Visual Loss  4. Facial Palsy: Partial Paralysis  5A. Motor - Left Arm: Some Effort Against Gravity  5B. Motor - Right Arm: No Effort Against Gravity  6A. Motor - Left Leg: Some Effort Against Gravity  6B. Motor - Right Leg: Some Effort Against Gravity  7. Limb Ataxia: Absent  8. Sensory Loss: Normal  9. Best Language: Mute, Global Aphasia  10. Dysarthria: Intubated  11. Extinction and Inattention: Visual, Tactile, Auditory, Spatial, or Personal Inattention  NIH Stroke Scale: 22     MEDICATIONS:  Scheduled: PRN: Continuous:   atorvastatin, 40 mg, nasogastric tube, Nightly  finasteride, 5 mg, oral, Daily  gabapentin, 300 mg, nasogastric tube, q AM  gabapentin, 600 mg, nasogastric tube, Nightly  heparin, 5,000 Units, subcutaneous, q8h  insulin lispro, 0-5 Units, subcutaneous, q6h  lidocaine, 1 Application, Topical, Once  oxymetazoline, 2 spray, Each Nostril, Once  pantoprazole, 40 mg, intravenous, Daily  perflutren protein A microsphere, 0.5 mL, intravenous, Once in imaging  piperacillin-tazobactam, 3.375 g, intravenous, q6h  sennosides-docusate sodium, 2 tablet, nasogastric tube, BID  sulfur hexafluoride microsphr, 2 mL, intravenous, Once in imaging     PRN medications: acetaminophen **OR** acetaminophen **OR** acetaminophen,  acetaminophen, dextrose, dextrose, glucagon, glucagon, hydrALAZINE, [] hydrALAZINE **FOLLOWED BY** hydrALAZINE, HYDROmorphone, oxygen fentaNYL,  mcg/hr, Last Rate: 50 mcg/hr (24 0400)  niCARdipine, 2.5-15 mg/hr         LAB RESULTS:  Results from last 72 hours   Lab Units 24  0241 24  0840   WBC AUTO x10*3/uL 22.3* 23.7*   NRBC AUTO /100 WBCs 0.0 0.0   RBC AUTO x10*6/uL 3.89* 4.14*   HEMOGLOBIN g/dL 10.6* 11.2*   HEMATOCRIT % 31.9* 34.1*   MCV fL 82 82   MCH pg 27.2 27.1   MCHC g/dL 33.2 32.8   RDW % 14.5 14.5   PLATELETS AUTO x10*3/uL 246 251   NEUTROS PCT AUTO % 81.6 76.4   IG PCT AUTO % 0.9 1.4*   LYMPHS PCT AUTO % 9.9 15.5   MONOS PCT AUTO % 7.4 6.3   EOS PCT AUTO % 0.0 0.1   BASOS PCT AUTO % 0.2 0.3   NEUTROS ABS x10*3/uL 18.21* 18.10*   IG AUTO x10*3/uL 0.21 0.34   LYMPHS ABS AUTO x10*3/uL 2.21 3.67*   MONOS ABS AUTO x10*3/uL 1.65* 1.49*   EOS ABS AUTO x10*3/uL 0.00 0.02   BASOS ABS AUTO x10*3/uL 0.04 0.06       Results from last 72 hours   Lab Units 24  0241 24  0050   GLUCOSE mg/dL 125* 112*   SODIUM mmol/L 146* 144   POTASSIUM mmol/L 3.5 3.9   CHLORIDE mmol/L 111* 113*   CO2 mmol/L 23 24   ANION GAP mmol/L 16 11   BUN mg/dL 26* 24*   CREATININE mg/dL 1.02 1.04   EGFR mL/min/1.73m*2 75 73   CALCIUM mg/dL 8.5* 8.3*   PHOSPHORUS mg/dL 2.8 2.5   ALBUMIN g/dL 3.1* 3.1*   MAGNESIUM mg/dL 2.24 2.11   POCT CALCIUM IONIZED (MMOL/L) IN BLOOD mmol/L 1.18 1.16      IMAGING RESULTS:  MR brain wo IV contrast   Final Result   1. Findings consistent with hemorrhagic transformation of anterior   left MCA territory infarct, with vasogenic edema throughout the left   frontal lobe and associated mass effect including left frontal lobe   sulcal effacement and partial effacement of the left lateral   ventricle. Additionally, there is development of 2 mm of   left-to-right midline shift which appears new/worsened when compared   to prior CT.   2. Extensive subarachnoid hemorrhage  diffusely throughout the   bilateral cerebral hemispheres, as well as layering intraventricular   hemorrhage within the lateral, 3rd, and 4th ventricles. Similar   minimal dilatation of the ventricles.        I personally reviewed the images/study and I agree with the findings   as stated by Dr. Petros Gonzalez M.D. This study was interpreted at   [place], [place].        MACRO:   None        Signed by: Ian Goldberg 7/19/2024 8:10 AM   Dictation workstation:   [id number]      CT head wo IV contrast   Final Result   Worsening interventricular hemorrhage.   Stable appearing midline shift.   On coronal imaging there is concern for expansion of the   intraparenchymal hemorrhage in the [place] insular cortex.        I personally reviewed the images/study and I agree with the findings   as stated. This study was interpreted at [place], [place].        MACRO:   None        Signed by: elmer lopez 7/17/2024 12:33 PM   Dictation workstation:   [id number]      Transthoracic Echo (TTE) Complete   Final Result      CT head wo IV contrast   Final Result   There is again evidence of a large area of irregular hyperdense   intraparenchymal hemorrhage within the lateral left frontal lobe   involving the left frontal lobe operculum and extending caudally into   the left subinsular region similar in appearance when compared with   the prior study dated 07/17/2024. There is surrounding hypodensity   involving cortex underlying white matter compatible with surrounding   edema with a prior CT of the head dated 07/16/2024 demonstrating   subtle asymmetric hypodensity involving cortex and underlying white   matter within left frontal lobe and left insula compatible with an   evolving area of acute infarction suggesting the intraparenchymal   hemorrhage may represent hemorrhagic transformation of the evolving   infarction. There is again evidence of  mass effect within the left   cerebral hemisphere contributing to partial effacement of the left   lateral ventricle with mild bowing of the midline structures from   left to right.        There is again evidence of extensive hyperdense subarachnoid   hemorrhage along sulci of the cerebral hemispheres bilaterally left   greater than right similar when compared with the prior study.        There is again evidence of hyperdense intraventricular hemorrhage   best appreciated within the lateral ventricles left greater than   right. There is mild interval increased intraventricular hemorrhage   within the lateral ventricles when compared with the prior study   dated 07/17/2024 time 12:23 a.m.. There is again evidence of   hyperdense intraventricular hemorrhage within the 4th ventricle   slightly less conspicuous on the current study although the posterior   fossa is suboptimally evaluated secondary to metallic artifact from   the patient's dental hardware.        The ventricular system is similar in size and configuration without   evidence of magaly hydrocephalus.        MACRO:   None.        Signed by: Robin Gonzalez 7/17/2024 5:08 AM   Dictation workstation:   [id number]      XR chest 1 view   Final Result   1.  Persistent right upper lobe collapse and consolidation with right   hemidiaphragmatic elevation.   2. Slightly improved rightward deviation of the trachea, with   endotracheal tube positioned 3.2 cm above the dina.        I personally reviewed the images/study and I agree with the findings   as stated by Hipolito Erazo MD (Radiology Resident).   This study was interpreted at [place] [facility], [place], [place].        MACRO:   None        Signed by: Duarte Kwan 7/17/2024 9:50 AM   Dictation workstation:   [id number]            Assessment/Plan    NEURO:  #Left MCA infarct s/p TNK complicated by hemorrhagic transformation   Assessment:  - Neurologically: see  above  - 7/16: TNK given at 1850  - CTH demonstrated new hematoma (4x5.8x4.2cm) in left frontal lobe and diffuse SAH in bilateral cerebral hemispheres, cerebellum, and basilar cisterns with mild to moderate IVH  - CTH 7/17 4AM with stable hyperdense intraparenchymal hemorrhage, MLS, hyperdense subarachnoid hemorrhage L>R, IVH; mild increase IVH in lateral ventricles compared to 12AM study  - S/p 10u of cryoprecipitate  - Home medication: gabapentin 300mg every morning, 600mg every night  - MRI brain 7/19 with L MCA stroke and extensive SAH plus IVH; minimal dilation of ventricles  Plan:  - NSU  - Neuro Checks: Q4H  - SBP goal <180  - Neurosurgery consulted  - DNR, pending transition to comfort care 7/20  - Gabapentin 300mg every morning, 600mg every night  - Pain: acetaminophen PRN, fent gtt at 50   - PT/OT/SLP    CARDIOVASCULAR:  #HTN, HLD  #Bradycardia  Assessment:  - Home medications: lisinopril 10mg daily, pravastatin 40mg daily  - ECHO: LVEF 65%, moderately dilated IVC  - EKG with sinus bradycardia, chronic RBBB and TWI in V1-V3 seen on prior EKGs  Plan:  - Continue to monitor on telemetry  - Atorvastatin 40mg daily  - holding IVF iso HTN  - BP goal: SBP <180 mmHg    --> PRN: Labetalol and Hydralazine   - Off Cardene gtt     RESPIRATORY:  #Acute respiratory insufficiency  #RUL collapse  Assessment:  - Intubated at OSH  - CXR 7/17 Persistent RUL collapse and consolidation with R hemidiaphragmatic elevation; improved deviation of trachea  Plan:  - Continuous pulse oximetry   - O2 PRN to maintain SpO2 > 94%, wean as tolerated  - Ventilator bundle while intubated  - Plan for palliative extubation with transition to comfort care 7/20    RENAL/:  #BPH  Assessment:  - Home medications: finasteride 5mg daily, tamsulosin 0.4mg daily   Results from last 72 hours   Lab Units 07/20/24  0241 07/19/24  0050   BUN mg/dL 26* 24*   CREATININE mg/dL 1.02 1.04   Plan:  - Monitor with daily RFP  - Finasteride 5mg daily  -  Maintain indwelling catheter for: critically ill patient who need accurate urinary output measurements    FEN/GI:  #No active issues  Assessment:  - Last BM:    Plan:  - Monitor and replace electrolytes per protocol  - holding IVF iso HTN  - Diet: NPO Diet; Effective now    - Bowel regimen: [place]-Colace    ENDOCRINE:  #Hyperglycemia  Assessment:  Results from last 7 days   Lab Units 24  0556 24  0241 24  0030 24  1610 24  1138 24  0850 24  0411 24  0050   POCT GLUCOSE mg/dL 133*  --  130* 109* 118* 128* 125*  --    GLUCOSE mg/dL  --  125*  --   --   --   --   --  112*   Plan:  - Accuchecks & ISS Q4H while NPO    HEMATOLOGY:  #Anemia, stable  Assessment:  - most likely dilutional  Results from last 7 days   Lab Units 24  0241 24  0840   HEMOGLOBIN g/dL 10.6* 11.2*   HEMATOCRIT % 31.9* 34.1*   PLATELETS AUTO x10*3/uL 246 251   Plan:  - Continue to monitor with daily CBC and Coag panel    INFECTIOUS DISEASE:  #Leukocytosis  Assessment:  Results from last 7 days   Lab Units 24  0241 24  0840   WBC AUTO x10*3/uL 22.3* 23.7*   - Temp (24hrs), Av.1 °C (98.7 °F), Min:36.6 °C (97.9 °F), Max:37.9 °C (100.2 °F)  - MRSA nares negative   - Sputum culture  with moderate mixed gram positive and gram negative bacteria  Plan:  - Continue to monitor for s/sx of infection  - Pan culture for temperature > 38.4 C  - Zosyn ( - )  - Vancomycin ()    MUSCULOSKELETAL:  - No acute issues    SKIN:  - No acute issues  - Turns and skin care per NSU protocol    ACCESS:  - PIVs  - L radial arterial line    PROPHYLAXIS:  - DVT Ppx: SCDs and SQH  - GI Ppx: Esomeprazole    RESTRAINTS:  I agree with nursing assessment of the patient's need for restraints to protect the patient from injury and facilitate healing. The patient is unable to cooperate with the plan of care and at risk for disrupting critical therapy (i.e., removing medical devices, lines, tubes  and/or dressings).  Please see order for specifics. Restraints can be removed when the patient is able to cooperate with plan of care and allow healing to occur, or the medical devices at risk are discontinued by the medical team.     Twila Sharma, VIDA-CNP  Neuroscience Intensive Care     Total critical care time of 60 minutes, with > 50% of time spent in direct contact with patient/family for education, counseling and coordination of care.

## 2024-07-20 NOTE — NURSING NOTE
RN Hospice Note    Met with pt's spouse Gisela and sons Moe and Nilson at bedside. Pt's son Antonio was on the phone. We discussed hospice benefit/services and goals of care. Family would prefer pt to go to hospice house in [place] for care. Pt's spouse Gisela deferred signing consents to her son Nilson. Nilson signed consents for hospice services. [facility]    Please call [facility] hospice team at [phone] if there are any questions/concerns or if pt expires.    Thank you,  Tati Lilly RN

## 2024-07-21 LAB
BACTERIA SPEC RESP CULT: ABNORMAL
BACTERIA SPEC RESP CULT: ABNORMAL
GRAM STN SPEC: ABNORMAL
GRAM STN SPEC: ABNORMAL

## 2024-07-26 ENCOUNTER — TELEPHONE (OUTPATIENT)
Dept: PAIN MEDICINE | Facility: HOSPITAL | Age: 79
End: 2024-07-26
Payer: MEDICARE

## 2024-08-22 DIAGNOSIS — I25.10 ATHEROSCLEROTIC HEART DISEASE OF NATIVE CORONARY ARTERY WITHOUT ANGINA PECTORIS: ICD-10-CM

## 2024-08-22 RX ORDER — PRAVASTATIN SODIUM 40 MG/1
40 TABLET ORAL DAILY
Qty: 90 TABLET | Refills: 3 | OUTPATIENT
Start: 2024-08-22

## 2024-08-26 ENCOUNTER — APPOINTMENT (OUTPATIENT)
Dept: PRIMARY CARE | Facility: CLINIC | Age: 79
End: 2024-08-26
Payer: MEDICARE

## 2024-09-11 ENCOUNTER — APPOINTMENT (OUTPATIENT)
Dept: PAIN MEDICINE | Facility: HOSPITAL | Age: 79
End: 2024-09-11
Payer: MEDICARE